# Patient Record
Sex: FEMALE | Race: BLACK OR AFRICAN AMERICAN | NOT HISPANIC OR LATINO | Employment: FULL TIME | URBAN - METROPOLITAN AREA
[De-identification: names, ages, dates, MRNs, and addresses within clinical notes are randomized per-mention and may not be internally consistent; named-entity substitution may affect disease eponyms.]

---

## 2017-06-06 ENCOUNTER — OFFICE VISIT (OUTPATIENT)
Dept: INTERNAL MEDICINE | Facility: CLINIC | Age: 47
End: 2017-06-06

## 2017-06-06 VITALS
BODY MASS INDEX: 27.58 KG/M2 | HEIGHT: 66 IN | TEMPERATURE: 97.8 F | DIASTOLIC BLOOD PRESSURE: 72 MMHG | WEIGHT: 171.6 LBS | SYSTOLIC BLOOD PRESSURE: 124 MMHG

## 2017-06-06 DIAGNOSIS — M43.16 SPONDYLOLISTHESIS AT L4-L5 LEVEL: ICD-10-CM

## 2017-06-06 DIAGNOSIS — R79.89 LOW VITAMIN D LEVEL: Primary | ICD-10-CM

## 2017-06-06 PROBLEM — Z80.3 FAMILY HISTORY OF BREAST CANCER IN FIRST DEGREE RELATIVE: Status: ACTIVE | Noted: 2017-06-06

## 2017-06-06 PROCEDURE — 99203 OFFICE O/P NEW LOW 30 MIN: CPT | Performed by: INTERNAL MEDICINE

## 2017-06-06 NOTE — PROGRESS NOTES
"Subjective   Marina Diaz is a 47 y.o. female here to establish care for vitamin D deficiency. She considers herself very healthy and doesn't have any medical problems. She comes to doc once per year for a physical. Right hip bothers her from time to time, has sacroiliac dysfunction. It does not bother her to consider any intervention; she did PT in the past. Otherwise she has no concerns. Brings lab work from physical in September 2017.    Review of Systems   Constitutional: Negative.    HENT: Negative.    Eyes: Negative.    Respiratory: Negative.    Cardiovascular: Negative.    Gastrointestinal: Negative.    Endocrine: Negative.    Genitourinary: Negative.    Musculoskeletal: Negative.    Skin: Negative.    Allergic/Immunologic: Negative.    Neurological: Negative.    Hematological: Negative.    Psychiatric/Behavioral: Negative.        Past Medical History:   Diagnosis Date   • Arthritis    • Heart murmur    • Ovarian cyst      Family History   Problem Relation Age of Onset   • Hypertension Mother    • Obesity Mother    • Cancer Other    • Diabetes Other      Past Surgical History:   Procedure Laterality Date   • BREAST LUMPECTOMY Bilateral 2007   • FOOT SURGERY       Social History     Social History   • Marital status: Single     Spouse name: N/A   • Number of children: N/A   • Years of education: N/A     Occupational History   • Not on file.     Social History Main Topics   • Smoking status: Never Smoker   • Smokeless tobacco: Never Used   • Alcohol use Yes      Comment: Rare   • Drug use: No   • Sexual activity: Not on file     Other Topics Concern   • Not on file     Social History Narrative   • No narrative on file         Current Outpatient Prescriptions:   •  Cholecalciferol (VITAMIN D3) 2000 UNITS tablet, Take  by mouth Daily., Disp: , Rfl:     Objective   /72 (BP Location: Right arm, Patient Position: Sitting, Cuff Size: Adult)  Temp 97.8 °F (36.6 °C) (Temporal Artery )   Ht 65.5\" (166.4 cm)  " Wt 171 lb 9.6 oz (77.8 kg)  BMI 28.12 kg/m2  Physical Exam   Constitutional: She is oriented to person, place, and time. She appears well-developed and well-nourished.   HENT:   Head: Normocephalic and atraumatic.   Cardiovascular: Normal rate, regular rhythm and normal heart sounds.  Exam reveals no gallop and no friction rub.    No murmur heard.  Pulmonary/Chest: Effort normal and breath sounds normal. She has no wheezes.   Musculoskeletal:   Normal gait and station   Neurological: She is alert and oriented to person, place, and time.   Skin: Skin is warm and dry.   Psychiatric: She has a normal mood and affect. Her behavior is normal. Judgment and thought content normal.   Vitals reviewed.      Assessment/Plan   Marina was seen today for establish care.    Diagnoses and all orders for this visit:    Low vitamin D level  -continue vit D supplement    Spondylolisthesis at L4-L5 level  -bothers her intermittently, advised ibu prn    RTC September for physical with labs, no pap sees gyn

## 2017-09-18 ENCOUNTER — OFFICE VISIT (OUTPATIENT)
Dept: INTERNAL MEDICINE | Facility: CLINIC | Age: 47
End: 2017-09-18

## 2017-09-18 VITALS
HEIGHT: 66 IN | BODY MASS INDEX: 29.28 KG/M2 | TEMPERATURE: 97.7 F | WEIGHT: 182.2 LBS | SYSTOLIC BLOOD PRESSURE: 126 MMHG | DIASTOLIC BLOOD PRESSURE: 74 MMHG

## 2017-09-18 DIAGNOSIS — Z00.00 HEALTH CARE MAINTENANCE: Primary | ICD-10-CM

## 2017-09-18 DIAGNOSIS — R79.89 LOW VITAMIN D LEVEL: ICD-10-CM

## 2017-09-18 LAB
25(OH)D3 SERPL-MCNC: 32 NG/ML
ALBUMIN SERPL-MCNC: 4 G/DL (ref 3.2–4.8)
ALBUMIN/GLOB SERPL: 1.3 G/DL (ref 1.5–2.5)
ALP SERPL-CCNC: 51 U/L (ref 25–100)
ALT SERPL W P-5'-P-CCNC: 14 U/L (ref 7–40)
ANION GAP SERPL CALCULATED.3IONS-SCNC: 3 MMOL/L (ref 3–11)
ARTICHOKE IGE QN: 159 MG/DL (ref 0–130)
AST SERPL-CCNC: 26 U/L (ref 0–33)
BASOPHILS # BLD AUTO: 0.01 10*3/MM3 (ref 0–0.2)
BASOPHILS NFR BLD AUTO: 0.2 % (ref 0–1)
BILIRUB SERPL-MCNC: 0.4 MG/DL (ref 0.3–1.2)
BUN BLD-MCNC: 12 MG/DL (ref 9–23)
BUN/CREAT SERPL: 15 (ref 7–25)
CALCIUM SPEC-SCNC: 8.9 MG/DL (ref 8.7–10.4)
CHLORIDE SERPL-SCNC: 104 MMOL/L (ref 99–109)
CHOLEST SERPL-MCNC: 250 MG/DL (ref 0–200)
CO2 SERPL-SCNC: 30 MMOL/L (ref 20–31)
CREAT BLD-MCNC: 0.8 MG/DL (ref 0.6–1.3)
DEPRECATED RDW RBC AUTO: 40.2 FL (ref 37–54)
EOSINOPHIL # BLD AUTO: 0.03 10*3/MM3 (ref 0–0.3)
EOSINOPHIL NFR BLD AUTO: 0.7 % (ref 0–3)
ERYTHROCYTE [DISTWIDTH] IN BLOOD BY AUTOMATED COUNT: 13.2 % (ref 11.3–14.5)
GFR SERPL CREATININE-BSD FRML MDRD: 94 ML/MIN/1.73
GLOBULIN UR ELPH-MCNC: 3.1 GM/DL
GLUCOSE BLD-MCNC: 90 MG/DL (ref 70–100)
HCT VFR BLD AUTO: 36.2 % (ref 34.5–44)
HDLC SERPL-MCNC: 68 MG/DL (ref 40–60)
HGB BLD-MCNC: 12.2 G/DL (ref 11.5–15.5)
IMM GRANULOCYTES # BLD: 0.01 10*3/MM3 (ref 0–0.03)
IMM GRANULOCYTES NFR BLD: 0.2 % (ref 0–0.6)
LYMPHOCYTES # BLD AUTO: 0.92 10*3/MM3 (ref 0.6–4.8)
LYMPHOCYTES NFR BLD AUTO: 22.4 % (ref 24–44)
MCH RBC QN AUTO: 28.2 PG (ref 27–31)
MCHC RBC AUTO-ENTMCNC: 33.7 G/DL (ref 32–36)
MCV RBC AUTO: 83.8 FL (ref 80–99)
MONOCYTES # BLD AUTO: 0.15 10*3/MM3 (ref 0–1)
MONOCYTES NFR BLD AUTO: 3.7 % (ref 0–12)
NEUTROPHILS # BLD AUTO: 2.98 10*3/MM3 (ref 1.5–8.3)
NEUTROPHILS NFR BLD AUTO: 72.8 % (ref 41–71)
PLATELET # BLD AUTO: 249 10*3/MM3 (ref 150–450)
PMV BLD AUTO: 11.3 FL (ref 6–12)
POTASSIUM BLD-SCNC: 4.3 MMOL/L (ref 3.5–5.5)
PROT SERPL-MCNC: 7.1 G/DL (ref 5.7–8.2)
RBC # BLD AUTO: 4.32 10*6/MM3 (ref 3.89–5.14)
SODIUM BLD-SCNC: 137 MMOL/L (ref 132–146)
TRIGL SERPL-MCNC: 96 MG/DL (ref 0–150)
WBC NRBC COR # BLD: 4.1 10*3/MM3 (ref 3.5–10.8)

## 2017-09-18 PROCEDURE — 99396 PREV VISIT EST AGE 40-64: CPT | Performed by: INTERNAL MEDICINE

## 2017-09-18 PROCEDURE — 80053 COMPREHEN METABOLIC PANEL: CPT | Performed by: INTERNAL MEDICINE

## 2017-09-18 PROCEDURE — 80061 LIPID PANEL: CPT | Performed by: INTERNAL MEDICINE

## 2017-09-18 PROCEDURE — 82306 VITAMIN D 25 HYDROXY: CPT | Performed by: INTERNAL MEDICINE

## 2017-09-18 PROCEDURE — 85025 COMPLETE CBC W/AUTO DIFF WBC: CPT | Performed by: INTERNAL MEDICINE

## 2017-09-18 NOTE — PROGRESS NOTES
"Subjective   Marina Diaz is a 47 y.o. female here for annual exam. No complaints today. No updates to past, family, social, surgical history, no new meds.    Review of Systems   Constitutional: Negative.    HENT: Negative.    Eyes: Negative.    Respiratory: Negative.    Cardiovascular: Negative.    Gastrointestinal: Negative.    Endocrine: Negative.    Genitourinary: Negative.    Musculoskeletal: Negative.    Skin: Negative.    Allergic/Immunologic: Negative.    Neurological: Negative.    Hematological: Negative.    Psychiatric/Behavioral: Negative.        Past Medical History:   Diagnosis Date   • Arthritis    • Heart murmur    • Ovarian cyst      Family History   Problem Relation Age of Onset   • Hypertension Mother    • Obesity Mother    • Cancer Other    • Diabetes Other      Past Surgical History:   Procedure Laterality Date   • BREAST LUMPECTOMY Bilateral 2007   • FOOT SURGERY       Social History     Social History   • Marital status: Single     Spouse name: N/A   • Number of children: N/A   • Years of education: N/A     Occupational History   • Not on file.     Social History Main Topics   • Smoking status: Never Smoker   • Smokeless tobacco: Never Used   • Alcohol use Yes      Comment: Rare   • Drug use: No   • Sexual activity: Not on file     Other Topics Concern   • Not on file     Social History Narrative         Current Outpatient Prescriptions:   •  Cholecalciferol (VITAMIN D3) 2000 UNITS tablet, Take  by mouth Daily., Disp: , Rfl:     Objective   /74 (BP Location: Left arm, Patient Position: Sitting, Cuff Size: Adult)  Temp 97.7 °F (36.5 °C) (Temporal Artery )   Ht 65.5\" (166.4 cm)  Wt 182 lb 3.2 oz (82.6 kg)  BMI 29.86 kg/m2  Physical Exam   Constitutional: She is oriented to person, place, and time. She appears well-developed and well-nourished. No distress.   HENT:   Head: Normocephalic and atraumatic.   Right Ear: Tympanic membrane, external ear and ear canal normal.   Left Ear: " Tympanic membrane, external ear and ear canal normal.   Nose: Nose normal.   Mouth/Throat: Oropharynx is clear and moist.   Eyes: Conjunctivae and lids are normal. Pupils are equal, round, and reactive to light. No scleral icterus.   Neck: Neck supple. Carotid bruit is not present. No thyroid mass and no thyromegaly present.   Cardiovascular: Normal rate, regular rhythm, normal heart sounds and intact distal pulses.  Exam reveals no gallop, no S3, no S4 and no friction rub.    No murmur heard.  Pulmonary/Chest: Effort normal and breath sounds normal. No respiratory distress. She has no wheezes. She has no rhonchi. She has no rales.   Abdominal: Soft. Bowel sounds are normal. She exhibits no distension and no mass. There is no hepatosplenomegaly. There is no tenderness.   Musculoskeletal: Normal range of motion.   Lymphadenopathy:     She has no cervical adenopathy.   Neurological: She is alert and oriented to person, place, and time. No cranial nerve deficit or sensory deficit.   Skin: Skin is warm and dry. No rash noted. No erythema. No pallor.   Psychiatric: She has a normal mood and affect. Her speech is normal and behavior is normal. Judgment and thought content normal. Cognition and memory are normal.   Vitals reviewed.      Assessment/Plan   Marina was seen today for annual exam.    Diagnoses and all orders for this visit:    Health care maintenance  -     CBC & Differential  -     Comprehensive Metabolic Panel  -     Lipid Panel  -     Vitamin D 25 Hydroxy  -     CBC Auto Differential    Low vitamin D level  -     Vitamin D 25 Hydroxy        1. HCM  -pap done recently by gyn  -mamm yearly, UTD  -cscope done previously  -fasting labs today  Reviewed the following with the patient: advised patient of need for:  immunizations discussed and influenza vaccine and ideal body weight discussed with patient.

## 2018-09-19 ENCOUNTER — OFFICE VISIT (OUTPATIENT)
Dept: INTERNAL MEDICINE | Facility: CLINIC | Age: 48
End: 2018-09-19

## 2018-09-19 VITALS
DIASTOLIC BLOOD PRESSURE: 70 MMHG | BODY MASS INDEX: 30.5 KG/M2 | SYSTOLIC BLOOD PRESSURE: 122 MMHG | TEMPERATURE: 97.1 F | WEIGHT: 189.8 LBS | HEIGHT: 66 IN

## 2018-09-19 DIAGNOSIS — R79.89 LOW VITAMIN D LEVEL: Primary | ICD-10-CM

## 2018-09-19 DIAGNOSIS — Z00.00 HEALTH CARE MAINTENANCE: ICD-10-CM

## 2018-09-19 DIAGNOSIS — R53.82 CHRONIC FATIGUE: ICD-10-CM

## 2018-09-19 LAB
25(OH)D3 SERPL-MCNC: 28.2 NG/ML
ALBUMIN SERPL-MCNC: 4.01 G/DL (ref 3.2–4.8)
ALBUMIN/GLOB SERPL: 1.7 G/DL (ref 1.5–2.5)
ALP SERPL-CCNC: 39 U/L (ref 25–100)
ALT SERPL W P-5'-P-CCNC: 13 U/L (ref 7–40)
ANION GAP SERPL CALCULATED.3IONS-SCNC: 7 MMOL/L (ref 3–11)
ARTICHOKE IGE QN: 116 MG/DL (ref 0–130)
AST SERPL-CCNC: 22 U/L (ref 0–33)
BILIRUB SERPL-MCNC: 0.4 MG/DL (ref 0.3–1.2)
BUN BLD-MCNC: 7 MG/DL (ref 9–23)
BUN/CREAT SERPL: 8.2 (ref 7–25)
CALCIUM SPEC-SCNC: 8.8 MG/DL (ref 8.7–10.4)
CHLORIDE SERPL-SCNC: 102 MMOL/L (ref 99–109)
CHOLEST SERPL-MCNC: 197 MG/DL (ref 0–200)
CO2 SERPL-SCNC: 26 MMOL/L (ref 20–31)
CREAT BLD-MCNC: 0.85 MG/DL (ref 0.6–1.3)
DEPRECATED RDW RBC AUTO: 40.2 FL (ref 37–54)
ERYTHROCYTE [DISTWIDTH] IN BLOOD BY AUTOMATED COUNT: 13.3 % (ref 11.3–14.5)
GFR SERPL CREATININE-BSD FRML MDRD: 87 ML/MIN/1.73
GLOBULIN UR ELPH-MCNC: 2.4 GM/DL
GLUCOSE BLD-MCNC: 76 MG/DL (ref 70–100)
HCT VFR BLD AUTO: 37.1 % (ref 34.5–44)
HDLC SERPL-MCNC: 55 MG/DL (ref 40–60)
HGB BLD-MCNC: 12.2 G/DL (ref 11.5–15.5)
MCH RBC QN AUTO: 27.8 PG (ref 27–31)
MCHC RBC AUTO-ENTMCNC: 32.9 G/DL (ref 32–36)
MCV RBC AUTO: 84.5 FL (ref 80–99)
PLATELET # BLD AUTO: 242 10*3/MM3 (ref 150–450)
PMV BLD AUTO: 12.1 FL (ref 6–12)
POTASSIUM BLD-SCNC: 4.5 MMOL/L (ref 3.5–5.5)
PROT SERPL-MCNC: 6.4 G/DL (ref 5.7–8.2)
RBC # BLD AUTO: 4.39 10*6/MM3 (ref 3.89–5.14)
SODIUM BLD-SCNC: 135 MMOL/L (ref 132–146)
TRIGL SERPL-MCNC: 144 MG/DL (ref 0–150)
TSH SERPL DL<=0.05 MIU/L-ACNC: 1.47 MIU/ML (ref 0.35–5.35)
VIT B12 BLD-MCNC: 472 PG/ML (ref 211–911)
WBC NRBC COR # BLD: 4.77 10*3/MM3 (ref 3.5–10.8)

## 2018-09-19 PROCEDURE — 80053 COMPREHEN METABOLIC PANEL: CPT | Performed by: INTERNAL MEDICINE

## 2018-09-19 PROCEDURE — 82306 VITAMIN D 25 HYDROXY: CPT | Performed by: INTERNAL MEDICINE

## 2018-09-19 PROCEDURE — 84443 ASSAY THYROID STIM HORMONE: CPT | Performed by: INTERNAL MEDICINE

## 2018-09-19 PROCEDURE — 99396 PREV VISIT EST AGE 40-64: CPT | Performed by: INTERNAL MEDICINE

## 2018-09-19 PROCEDURE — 85027 COMPLETE CBC AUTOMATED: CPT | Performed by: INTERNAL MEDICINE

## 2018-09-19 PROCEDURE — 80061 LIPID PANEL: CPT | Performed by: INTERNAL MEDICINE

## 2018-09-19 PROCEDURE — 82607 VITAMIN B-12: CPT | Performed by: INTERNAL MEDICINE

## 2018-09-19 NOTE — PROGRESS NOTES
Subjective   Marina iDaz is a 48 y.o. female here for annual exam.  She is UTD pap, had mammo earlier this year which was negative. Mother with in situ breast cancer (still living, in remission). She inquires about a cscope for screening purposes. Fasting for labs today. Still struggles with right sided back pain and sciatica from time to time. Has chronic fatigue and weight gain despite not changing diet.     Review of Systems   Constitutional: Positive for fatigue and unexpected weight change.   HENT: Negative.    Eyes: Negative.    Respiratory: Negative.    Cardiovascular: Negative.    Gastrointestinal: Negative.    Endocrine: Negative.    Genitourinary: Negative.    Musculoskeletal: Positive for back pain.   Skin: Negative.    Allergic/Immunologic: Negative.    Neurological:        Sciatica   Hematological: Negative.    Psychiatric/Behavioral: Negative.        Past Medical History:   Diagnosis Date   • Arthritis    • Heart murmur    • Ovarian cyst      Family History   Problem Relation Age of Onset   • Hypertension Mother    • Obesity Mother    • Cancer Other    • Diabetes Other      Past Surgical History:   Procedure Laterality Date   • BREAST LUMPECTOMY Bilateral 2007   • FOOT SURGERY       Social History     Social History   • Marital status: Single     Spouse name: N/A   • Number of children: N/A   • Years of education: N/A     Occupational History   • Not on file.     Social History Main Topics   • Smoking status: Never Smoker   • Smokeless tobacco: Never Used   • Alcohol use Yes      Comment: Rare   • Drug use: No   • Sexual activity: Not on file     Other Topics Concern   • Not on file     Social History Narrative   • No narrative on file         Current Outpatient Prescriptions:   •  Cholecalciferol (VITAMIN D3) 2000 UNITS tablet, Take  by mouth Daily., Disp: , Rfl:     Objective   /70 (BP Location: Right arm, Patient Position: Sitting, Cuff Size: Adult)   Temp 97.1 °F (36.2 °C) (Temporal  "Artery )   Ht 166.4 cm (65.5\")   Wt 86.1 kg (189 lb 12.8 oz)   BMI 31.10 kg/m²   Physical Exam   Constitutional: She is oriented to person, place, and time. She appears well-developed and well-nourished. No distress.   HENT:   Head: Normocephalic and atraumatic.   Right Ear: Tympanic membrane, external ear and ear canal normal.   Left Ear: Tympanic membrane, external ear and ear canal normal.   Nose: Nose normal.   Mouth/Throat: Oropharynx is clear and moist.   Eyes: Pupils are equal, round, and reactive to light. Conjunctivae and lids are normal. No scleral icterus.   Neck: Neck supple. Carotid bruit is not present. No thyroid mass and no thyromegaly present.   Cardiovascular: Normal rate, regular rhythm, normal heart sounds and intact distal pulses.  Exam reveals no gallop, no S3, no S4 and no friction rub.    No murmur heard.  Pulmonary/Chest: Effort normal and breath sounds normal. No respiratory distress. She has no wheezes. She has no rhonchi. She has no rales.   Abdominal: Soft. Bowel sounds are normal. She exhibits no distension and no mass. There is no hepatosplenomegaly. There is no tenderness.   Musculoskeletal: Normal range of motion.   Lymphadenopathy:     She has no cervical adenopathy.   Neurological: She is alert and oriented to person, place, and time. No cranial nerve deficit or sensory deficit.   Skin: Skin is warm and dry. No rash noted. No erythema. No pallor.   Psychiatric: She has a normal mood and affect. Her speech is normal and behavior is normal. Judgment and thought content normal. Cognition and memory are normal.   Vitals reviewed.      Assessment/Plan   Marina was seen today for annual exam.    Diagnoses and all orders for this visit:    Low vitamin D level  -     Vitamin D 25 Hydroxy    Health care maintenance  -     Lipid Panel  -     CBC (No Diff)  -     Comprehensive Metabolic Panel    Chronic fatigue  -     Vitamin B12  -     TSH        1. HCM  -pap UTD  -mamm UTD, mother with " breast cancer  -cscope ordered by gyn  -fasting labs today  -defers flu vaccine  Reviewed the following with the patient: advised patient of need for:  mammogram, colonoscopy and immunizations discussed and weight loss encouraged.

## 2019-02-18 ENCOUNTER — OFFICE VISIT (OUTPATIENT)
Dept: INTERNAL MEDICINE | Facility: CLINIC | Age: 49
End: 2019-02-18

## 2019-02-18 ENCOUNTER — HOSPITAL ENCOUNTER (OUTPATIENT)
Dept: CT IMAGING | Facility: HOSPITAL | Age: 49
Discharge: HOME | End: 2019-02-18
Admitting: INTERNAL MEDICINE

## 2019-02-18 VITALS — HEIGHT: 66 IN | TEMPERATURE: 97.9 F | BODY MASS INDEX: 29.92 KG/M2 | WEIGHT: 186.2 LBS

## 2019-02-18 DIAGNOSIS — R11.0 NAUSEA: ICD-10-CM

## 2019-02-18 DIAGNOSIS — R19.03 RIGHT LOWER QUADRANT ABDOMINAL MASS: ICD-10-CM

## 2019-02-18 DIAGNOSIS — R19.03 RIGHT LOWER QUADRANT ABDOMINAL MASS: Primary | ICD-10-CM

## 2019-02-18 PROCEDURE — 99214 OFFICE O/P EST MOD 30 MIN: CPT | Performed by: INTERNAL MEDICINE

## 2019-02-18 PROCEDURE — 25010000002 IOPAMIDOL 61 % SOLUTION: Performed by: INTERNAL MEDICINE

## 2019-02-18 PROCEDURE — 74178 CT ABD&PLV WO CNTR FLWD CNTR: CPT

## 2019-02-18 RX ADMIN — BARIUM SULFATE 450 ML: 21 SUSPENSION ORAL at 15:30

## 2019-02-18 RX ADMIN — IOPAMIDOL 95 ML: 612 INJECTION, SOLUTION INTRAVENOUS at 16:33

## 2019-02-18 NOTE — PROGRESS NOTES
"Subjective   Marina Diaz is a 48 y.o. female here for nausea and RLQ pain occurring for several weeks/months. Has felt a mass in the RLQ only twice, unsure if it goes away. She had her first cscope this fall that was negative. She has regular menses, though they have been heavier lately. Some nausea every morning. Regular brown formed BMs. No weight loss, fever, chills. Has hx of ovarian cysts. The pain is colicky. Nothing brings it on, will feel like pressure and pain in RLQ, goes away after a time. Happens a few times a week.       The following portions of the patient's history were reviewed and updated as appropriate: allergies, current medications, past medical history, past social history, past surgical history and problem list.    Review of Systems:  General: negative  : change in menses  GI: see hpi    Objective   Temp 97.9 °F (36.6 °C) (Temporal)   Ht 166.4 cm (65.5\")   Wt 84.5 kg (186 lb 3.2 oz)   BMI 30.51 kg/m²     Physical Exam   Constitutional: She is oriented to person, place, and time. She appears well-developed and well-nourished.   Pulmonary/Chest: Effort normal.   Abdominal:       ~7cm oval mass felt in indicated area, firm, not moveable, no guarding, some tenderness reported. Suprapubic area also feels very firm though no edema/ascites   Neurological: She is alert and oriented to person, place, and time.   Skin: Skin is warm and dry.   Psychiatric: She has a normal mood and affect. Her behavior is normal. Judgment and thought content normal.   Vitals reviewed.      Assessment/Plan   Marina was seen today for abdominal pain.    Diagnoses and all orders for this visit:    Right lower quadrant abdominal mass  -     Very concerned given palpable mass and firm uterus. CT STAT. Discussed possible etiology with pt, including possible cancer discussed with pt. Given RLQ mass and firm         uterus, would favor  etiology  -     CT Abdomen Pelvis With & Without Contrast; " Future    Nausea

## 2019-05-21 ENCOUNTER — APPOINTMENT (OUTPATIENT)
Dept: PREADMISSION TESTING | Facility: HOSPITAL | Age: 49
End: 2019-05-21

## 2019-05-21 VITALS — BODY MASS INDEX: 29.69 KG/M2 | WEIGHT: 184.75 LBS | HEIGHT: 66 IN

## 2019-05-21 DIAGNOSIS — Z01.89 LABORATORY TEST: Primary | ICD-10-CM

## 2019-05-21 LAB
ANION GAP SERPL CALCULATED.3IONS-SCNC: 11 MMOL/L
BUN BLD-MCNC: 10 MG/DL (ref 6–20)
BUN/CREAT SERPL: 12 (ref 7–25)
CALCIUM SPEC-SCNC: 8.7 MG/DL (ref 8.6–10.5)
CHLORIDE SERPL-SCNC: 103 MMOL/L (ref 98–107)
CO2 SERPL-SCNC: 25 MMOL/L (ref 22–29)
CREAT BLD-MCNC: 0.83 MG/DL (ref 0.57–1)
DEPRECATED RDW RBC AUTO: 40.4 FL (ref 37–54)
ERYTHROCYTE [DISTWIDTH] IN BLOOD BY AUTOMATED COUNT: 13.3 % (ref 12.3–15.4)
GFR SERPL CREATININE-BSD FRML MDRD: 89 ML/MIN/1.73
GLUCOSE BLD-MCNC: 88 MG/DL (ref 65–99)
HBA1C MFR BLD: 4.9 % (ref 4.8–5.6)
HCT VFR BLD AUTO: 34.6 % (ref 34–46.6)
HGB BLD-MCNC: 11.5 G/DL (ref 12–15.9)
MCH RBC QN AUTO: 27.9 PG (ref 26.6–33)
MCHC RBC AUTO-ENTMCNC: 33.2 G/DL (ref 31.5–35.7)
MCV RBC AUTO: 84 FL (ref 79–97)
PLATELET # BLD AUTO: 237 10*3/MM3 (ref 140–450)
PMV BLD AUTO: 11.1 FL (ref 6–12)
POTASSIUM BLD-SCNC: 3.7 MMOL/L (ref 3.5–5.2)
RBC # BLD AUTO: 4.12 10*6/MM3 (ref 3.77–5.28)
SODIUM BLD-SCNC: 139 MMOL/L (ref 136–145)
WBC NRBC COR # BLD: 4.54 10*3/MM3 (ref 3.4–10.8)

## 2019-05-21 PROCEDURE — 83036 HEMOGLOBIN GLYCOSYLATED A1C: CPT | Performed by: OBSTETRICS & GYNECOLOGY

## 2019-05-21 PROCEDURE — 85027 COMPLETE CBC AUTOMATED: CPT | Performed by: OBSTETRICS & GYNECOLOGY

## 2019-05-21 PROCEDURE — 86900 BLOOD TYPING SEROLOGIC ABO: CPT

## 2019-05-21 PROCEDURE — 86901 BLOOD TYPING SEROLOGIC RH(D): CPT

## 2019-05-21 PROCEDURE — 36415 COLL VENOUS BLD VENIPUNCTURE: CPT

## 2019-05-21 PROCEDURE — 80048 BASIC METABOLIC PNL TOTAL CA: CPT | Performed by: OBSTETRICS & GYNECOLOGY

## 2019-05-22 LAB
ABO GROUP BLD: NORMAL
RH BLD: NEGATIVE

## 2019-05-29 ENCOUNTER — ANESTHESIA (OUTPATIENT)
Dept: PERIOP | Facility: HOSPITAL | Age: 49
End: 2019-05-29

## 2019-05-29 ENCOUNTER — ANESTHESIA EVENT (OUTPATIENT)
Dept: PERIOP | Facility: HOSPITAL | Age: 49
End: 2019-05-29

## 2019-05-29 ENCOUNTER — HOSPITAL ENCOUNTER (INPATIENT)
Facility: HOSPITAL | Age: 49
LOS: 2 days | Discharge: HOME | End: 2019-05-31
Attending: OBSTETRICS & GYNECOLOGY | Admitting: OBSTETRICS & GYNECOLOGY

## 2019-05-29 DIAGNOSIS — D25.1 FIBROIDS, INTRAMURAL: ICD-10-CM

## 2019-05-29 PROBLEM — Z90.710 S/P ABDOMINAL HYSTERECTOMY: Status: ACTIVE | Noted: 2019-05-29

## 2019-05-29 PROBLEM — Z90.710 S/P ABDOMINAL HYSTERECTOMY: Status: RESOLVED | Noted: 2019-05-29 | Resolved: 2019-05-29

## 2019-05-29 LAB
ABO GROUP BLD: NORMAL
B-HCG UR QL: NEGATIVE
BLD GP AB SCN SERPL QL: NEGATIVE
INTERNAL NEGATIVE CONTROL: NEGATIVE
INTERNAL POSITIVE CONTROL: POSITIVE
Lab: NORMAL
RH BLD: NEGATIVE
T&S EXPIRATION DATE: NORMAL

## 2019-05-29 PROCEDURE — 25010000002 NEOSTIGMINE 10 MG/10ML SOLUTION: Performed by: NURSE ANESTHETIST, CERTIFIED REGISTERED

## 2019-05-29 PROCEDURE — 25010000002 PROPOFOL 10 MG/ML EMULSION: Performed by: NURSE ANESTHETIST, CERTIFIED REGISTERED

## 2019-05-29 PROCEDURE — 81025 URINE PREGNANCY TEST: CPT | Performed by: OBSTETRICS & GYNECOLOGY

## 2019-05-29 PROCEDURE — 88307 TISSUE EXAM BY PATHOLOGIST: CPT | Performed by: OBSTETRICS & GYNECOLOGY

## 2019-05-29 PROCEDURE — 86900 BLOOD TYPING SEROLOGIC ABO: CPT | Performed by: OBSTETRICS & GYNECOLOGY

## 2019-05-29 PROCEDURE — 0UT70ZZ RESECTION OF BILATERAL FALLOPIAN TUBES, OPEN APPROACH: ICD-10-PCS | Performed by: OBSTETRICS & GYNECOLOGY

## 2019-05-29 PROCEDURE — 25010000002 DEXAMETHASONE SODIUM PHOSPHATE 10 MG/ML SOLUTION: Performed by: NURSE ANESTHETIST, CERTIFIED REGISTERED

## 2019-05-29 PROCEDURE — 25010000003 CEFAZOLIN IN DEXTROSE 2-4 GM/100ML-% SOLUTION: Performed by: OBSTETRICS & GYNECOLOGY

## 2019-05-29 PROCEDURE — 86850 RBC ANTIBODY SCREEN: CPT | Performed by: OBSTETRICS & GYNECOLOGY

## 2019-05-29 PROCEDURE — 25010000002 ONDANSETRON PER 1 MG: Performed by: NURSE ANESTHETIST, CERTIFIED REGISTERED

## 2019-05-29 PROCEDURE — 25010000002 HEPARIN (PORCINE) 5000 UNIT/0.5ML SOLUTION: Performed by: OBSTETRICS & GYNECOLOGY

## 2019-05-29 PROCEDURE — 88311 DECALCIFY TISSUE: CPT | Performed by: OBSTETRICS & GYNECOLOGY

## 2019-05-29 PROCEDURE — 86901 BLOOD TYPING SEROLOGIC RH(D): CPT | Performed by: OBSTETRICS & GYNECOLOGY

## 2019-05-29 PROCEDURE — 0UT90ZZ RESECTION OF UTERUS, OPEN APPROACH: ICD-10-PCS | Performed by: OBSTETRICS & GYNECOLOGY

## 2019-05-29 PROCEDURE — 25010000002 BUPRENORPHINE PER 0.1 MG: Performed by: NURSE ANESTHETIST, CERTIFIED REGISTERED

## 2019-05-29 DEVICE — SEALANT WND FIBRIN TISSEEL PREFIL/SYR/PRIMAFZ 4ML: Type: IMPLANTABLE DEVICE | Status: FUNCTIONAL

## 2019-05-29 RX ORDER — CEFAZOLIN SODIUM 2 G/100ML
2 INJECTION, SOLUTION INTRAVENOUS ONCE
Status: COMPLETED | OUTPATIENT
Start: 2019-05-29 | End: 2019-05-29

## 2019-05-29 RX ORDER — HYDROCODONE BITARTRATE AND ACETAMINOPHEN 7.5; 325 MG/1; MG/1
1 TABLET ORAL EVERY 4 HOURS PRN
Status: DISCONTINUED | OUTPATIENT
Start: 2019-05-29 | End: 2019-05-31 | Stop reason: HOSPADM

## 2019-05-29 RX ORDER — FIBRINOGEN HUMAN AND THROMBIN HUMAN 90; 500 [IU]/ML; [IU]/ML
SOLUTION TOPICAL AS NEEDED
Status: DISCONTINUED | OUTPATIENT
Start: 2019-05-29 | End: 2019-05-29 | Stop reason: HOSPADM

## 2019-05-29 RX ORDER — KETOROLAC TROMETHAMINE 30 MG/ML
30 INJECTION, SOLUTION INTRAMUSCULAR; INTRAVENOUS EVERY 6 HOURS PRN
Status: DISCONTINUED | OUTPATIENT
Start: 2019-05-29 | End: 2019-05-30

## 2019-05-29 RX ORDER — ATRACURIUM BESYLATE 10 MG/ML
INJECTION, SOLUTION INTRAVENOUS AS NEEDED
Status: DISCONTINUED | OUTPATIENT
Start: 2019-05-29 | End: 2019-05-29 | Stop reason: SURG

## 2019-05-29 RX ORDER — FENTANYL CITRATE 50 UG/ML
50 INJECTION, SOLUTION INTRAMUSCULAR; INTRAVENOUS
Status: DISCONTINUED | OUTPATIENT
Start: 2019-05-29 | End: 2019-05-29 | Stop reason: HOSPADM

## 2019-05-29 RX ORDER — BUPIVACAINE HYDROCHLORIDE 2.5 MG/ML
INJECTION, SOLUTION EPIDURAL; INFILTRATION; INTRACAUDAL
Status: COMPLETED | OUTPATIENT
Start: 2019-05-29 | End: 2019-05-29

## 2019-05-29 RX ORDER — SIMETHICONE 80 MG
80 TABLET,CHEWABLE ORAL 4 TIMES DAILY PRN
Status: DISCONTINUED | OUTPATIENT
Start: 2019-05-29 | End: 2019-05-31 | Stop reason: HOSPADM

## 2019-05-29 RX ORDER — LIDOCAINE HYDROCHLORIDE 10 MG/ML
0.5 INJECTION, SOLUTION EPIDURAL; INFILTRATION; INTRACAUDAL; PERINEURAL ONCE AS NEEDED
Status: COMPLETED | OUTPATIENT
Start: 2019-05-29 | End: 2019-05-29

## 2019-05-29 RX ORDER — PROPOFOL 10 MG/ML
VIAL (ML) INTRAVENOUS CONTINUOUS PRN
Status: DISCONTINUED | OUTPATIENT
Start: 2019-05-29 | End: 2019-05-29 | Stop reason: SURG

## 2019-05-29 RX ORDER — SODIUM CHLORIDE 0.9 % (FLUSH) 0.9 %
3-10 SYRINGE (ML) INJECTION AS NEEDED
Status: DISCONTINUED | OUTPATIENT
Start: 2019-05-29 | End: 2019-05-29 | Stop reason: HOSPADM

## 2019-05-29 RX ORDER — PROMETHAZINE HYDROCHLORIDE 12.5 MG/1
12.5 TABLET ORAL EVERY 6 HOURS PRN
Status: DISCONTINUED | OUTPATIENT
Start: 2019-05-29 | End: 2019-05-31 | Stop reason: HOSPADM

## 2019-05-29 RX ORDER — ONDANSETRON 4 MG/1
4 TABLET, FILM COATED ORAL EVERY 6 HOURS PRN
Status: DISCONTINUED | OUTPATIENT
Start: 2019-05-29 | End: 2019-05-31 | Stop reason: HOSPADM

## 2019-05-29 RX ORDER — PROPOFOL 10 MG/ML
VIAL (ML) INTRAVENOUS AS NEEDED
Status: DISCONTINUED | OUTPATIENT
Start: 2019-05-29 | End: 2019-05-29 | Stop reason: SURG

## 2019-05-29 RX ORDER — FAMOTIDINE 10 MG/ML
20 INJECTION, SOLUTION INTRAVENOUS ONCE
Status: DISCONTINUED | OUTPATIENT
Start: 2019-05-29 | End: 2019-05-29

## 2019-05-29 RX ORDER — PROMETHAZINE HYDROCHLORIDE 12.5 MG/1
12.5 SUPPOSITORY RECTAL EVERY 6 HOURS PRN
Status: DISCONTINUED | OUTPATIENT
Start: 2019-05-29 | End: 2019-05-31 | Stop reason: HOSPADM

## 2019-05-29 RX ORDER — ESMOLOL HYDROCHLORIDE 10 MG/ML
INJECTION INTRAVENOUS AS NEEDED
Status: DISCONTINUED | OUTPATIENT
Start: 2019-05-29 | End: 2019-05-29 | Stop reason: SURG

## 2019-05-29 RX ORDER — ONDANSETRON 2 MG/ML
4 INJECTION INTRAMUSCULAR; INTRAVENOUS ONCE AS NEEDED
Status: DISCONTINUED | OUTPATIENT
Start: 2019-05-29 | End: 2019-05-29 | Stop reason: HOSPADM

## 2019-05-29 RX ORDER — PROMETHAZINE HYDROCHLORIDE 25 MG/1
25 TABLET ORAL ONCE AS NEEDED
Status: DISCONTINUED | OUTPATIENT
Start: 2019-05-29 | End: 2019-05-29 | Stop reason: HOSPADM

## 2019-05-29 RX ORDER — MAGNESIUM HYDROXIDE 1200 MG/15ML
LIQUID ORAL AS NEEDED
Status: DISCONTINUED | OUTPATIENT
Start: 2019-05-29 | End: 2019-05-29 | Stop reason: HOSPADM

## 2019-05-29 RX ORDER — SODIUM CHLORIDE 9 MG/ML
100 INJECTION, SOLUTION INTRAVENOUS CONTINUOUS
Status: DISCONTINUED | OUTPATIENT
Start: 2019-05-29 | End: 2019-05-31 | Stop reason: HOSPADM

## 2019-05-29 RX ORDER — NALOXONE HCL 0.4 MG/ML
0.1 VIAL (ML) INJECTION
Status: DISCONTINUED | OUTPATIENT
Start: 2019-05-29 | End: 2019-05-31 | Stop reason: HOSPADM

## 2019-05-29 RX ORDER — ONDANSETRON 2 MG/ML
4 INJECTION INTRAMUSCULAR; INTRAVENOUS EVERY 6 HOURS PRN
Status: DISCONTINUED | OUTPATIENT
Start: 2019-05-29 | End: 2019-05-31 | Stop reason: HOSPADM

## 2019-05-29 RX ORDER — PROMETHAZINE HYDROCHLORIDE 25 MG/ML
12.5 INJECTION, SOLUTION INTRAMUSCULAR; INTRAVENOUS EVERY 6 HOURS PRN
Status: DISCONTINUED | OUTPATIENT
Start: 2019-05-29 | End: 2019-05-31 | Stop reason: HOSPADM

## 2019-05-29 RX ORDER — NEOSTIGMINE METHYLSULFATE 1 MG/ML
INJECTION, SOLUTION INTRAVENOUS AS NEEDED
Status: DISCONTINUED | OUTPATIENT
Start: 2019-05-29 | End: 2019-05-29 | Stop reason: SURG

## 2019-05-29 RX ORDER — HEPARIN SODIUM 5000 [USP'U]/.5ML
INJECTION, SOLUTION INTRAVENOUS; SUBCUTANEOUS AS NEEDED
Status: DISCONTINUED | OUTPATIENT
Start: 2019-05-29 | End: 2019-05-29 | Stop reason: HOSPADM

## 2019-05-29 RX ORDER — MEPERIDINE HYDROCHLORIDE 25 MG/ML
12.5 INJECTION INTRAMUSCULAR; INTRAVENOUS; SUBCUTANEOUS ONCE
Status: COMPLETED | OUTPATIENT
Start: 2019-05-29 | End: 2019-05-29

## 2019-05-29 RX ORDER — HYDROMORPHONE HYDROCHLORIDE 1 MG/ML
0.5 INJECTION, SOLUTION INTRAMUSCULAR; INTRAVENOUS; SUBCUTANEOUS
Status: DISCONTINUED | OUTPATIENT
Start: 2019-05-29 | End: 2019-05-29 | Stop reason: HOSPADM

## 2019-05-29 RX ORDER — CEFAZOLIN SODIUM 2 G/100ML
2 INJECTION, SOLUTION INTRAVENOUS EVERY 8 HOURS
Status: COMPLETED | OUTPATIENT
Start: 2019-05-29 | End: 2019-05-29

## 2019-05-29 RX ORDER — PROMETHAZINE HYDROCHLORIDE 25 MG/1
25 SUPPOSITORY RECTAL ONCE AS NEEDED
Status: DISCONTINUED | OUTPATIENT
Start: 2019-05-29 | End: 2019-05-29 | Stop reason: HOSPADM

## 2019-05-29 RX ORDER — DEXAMETHASONE SODIUM PHOSPHATE 10 MG/ML
INJECTION, SOLUTION INTRAMUSCULAR; INTRAVENOUS
Status: COMPLETED | OUTPATIENT
Start: 2019-05-29 | End: 2019-05-29

## 2019-05-29 RX ORDER — PHENAZOPYRIDINE HYDROCHLORIDE 100 MG/1
200 TABLET, FILM COATED ORAL ONCE
Status: COMPLETED | OUTPATIENT
Start: 2019-05-29 | End: 2019-05-29

## 2019-05-29 RX ORDER — TEMAZEPAM 15 MG/1
15 CAPSULE ORAL NIGHTLY PRN
Status: DISCONTINUED | OUTPATIENT
Start: 2019-05-29 | End: 2019-05-31 | Stop reason: HOSPADM

## 2019-05-29 RX ORDER — HEPARIN SODIUM 5000 [USP'U]/ML
5000 INJECTION, SOLUTION INTRAVENOUS; SUBCUTANEOUS EVERY 12 HOURS SCHEDULED
Status: DISCONTINUED | OUTPATIENT
Start: 2019-05-30 | End: 2019-05-31 | Stop reason: HOSPADM

## 2019-05-29 RX ORDER — ONDANSETRON 2 MG/ML
INJECTION INTRAMUSCULAR; INTRAVENOUS AS NEEDED
Status: DISCONTINUED | OUTPATIENT
Start: 2019-05-29 | End: 2019-05-29 | Stop reason: SURG

## 2019-05-29 RX ORDER — FAMOTIDINE 20 MG/1
20 TABLET, FILM COATED ORAL ONCE
Status: COMPLETED | OUTPATIENT
Start: 2019-05-29 | End: 2019-05-29

## 2019-05-29 RX ORDER — GLYCOPYRROLATE 0.2 MG/ML
INJECTION INTRAMUSCULAR; INTRAVENOUS AS NEEDED
Status: DISCONTINUED | OUTPATIENT
Start: 2019-05-29 | End: 2019-05-29 | Stop reason: SURG

## 2019-05-29 RX ORDER — LIDOCAINE HYDROCHLORIDE 10 MG/ML
INJECTION, SOLUTION EPIDURAL; INFILTRATION; INTRACAUDAL; PERINEURAL AS NEEDED
Status: DISCONTINUED | OUTPATIENT
Start: 2019-05-29 | End: 2019-05-29 | Stop reason: SURG

## 2019-05-29 RX ORDER — PROMETHAZINE HYDROCHLORIDE 25 MG/ML
6.25 INJECTION, SOLUTION INTRAMUSCULAR; INTRAVENOUS ONCE AS NEEDED
Status: DISCONTINUED | OUTPATIENT
Start: 2019-05-29 | End: 2019-05-29 | Stop reason: HOSPADM

## 2019-05-29 RX ORDER — SODIUM CHLORIDE 0.9 % (FLUSH) 0.9 %
3 SYRINGE (ML) INJECTION EVERY 12 HOURS SCHEDULED
Status: DISCONTINUED | OUTPATIENT
Start: 2019-05-29 | End: 2019-05-29 | Stop reason: HOSPADM

## 2019-05-29 RX ORDER — HEPARIN SODIUM 5000 [USP'U]/ML
5000 INJECTION, SOLUTION INTRAVENOUS; SUBCUTANEOUS ONCE
Status: DISCONTINUED | OUTPATIENT
Start: 2019-05-29 | End: 2019-05-29 | Stop reason: HOSPADM

## 2019-05-29 RX ORDER — BUPRENORPHINE HYDROCHLORIDE 0.32 MG/ML
INJECTION INTRAMUSCULAR; INTRAVENOUS
Status: COMPLETED | OUTPATIENT
Start: 2019-05-29 | End: 2019-05-29

## 2019-05-29 RX ORDER — HYDROMORPHONE HYDROCHLORIDE 1 MG/ML
0.5 INJECTION, SOLUTION INTRAMUSCULAR; INTRAVENOUS; SUBCUTANEOUS
Status: DISCONTINUED | OUTPATIENT
Start: 2019-05-29 | End: 2019-05-31 | Stop reason: HOSPADM

## 2019-05-29 RX ORDER — SODIUM CHLORIDE, SODIUM LACTATE, POTASSIUM CHLORIDE, CALCIUM CHLORIDE 600; 310; 30; 20 MG/100ML; MG/100ML; MG/100ML; MG/100ML
9 INJECTION, SOLUTION INTRAVENOUS CONTINUOUS
Status: DISCONTINUED | OUTPATIENT
Start: 2019-05-29 | End: 2019-05-31 | Stop reason: HOSPADM

## 2019-05-29 RX ADMIN — DEXAMETHASONE SODIUM PHOSPHATE 4 MG: 10 INJECTION INTRAMUSCULAR; INTRAVENOUS at 07:41

## 2019-05-29 RX ADMIN — FAMOTIDINE 20 MG: 20 TABLET ORAL at 06:41

## 2019-05-29 RX ADMIN — GLYCOPYRROLATE 0.2 MG: 0.2 INJECTION, SOLUTION INTRAMUSCULAR; INTRAVENOUS at 08:21

## 2019-05-29 RX ADMIN — CEFAZOLIN SODIUM 2 G: 2 INJECTION, SOLUTION INTRAVENOUS at 15:32

## 2019-05-29 RX ADMIN — MEPERIDINE HYDROCHLORIDE 12.5 MG: 25 INJECTION INTRAMUSCULAR; INTRAVENOUS; SUBCUTANEOUS at 10:59

## 2019-05-29 RX ADMIN — PROPOFOL 200 MG: 10 INJECTION, EMULSION INTRAVENOUS at 07:39

## 2019-05-29 RX ADMIN — CEFAZOLIN SODIUM 2 G: 2 INJECTION, SOLUTION INTRAVENOUS at 23:14

## 2019-05-29 RX ADMIN — CEFAZOLIN SODIUM 2 G: 2 INJECTION, SOLUTION INTRAVENOUS at 07:42

## 2019-05-29 RX ADMIN — DEXAMETHASONE SODIUM PHOSPHATE 6 MG: 10 INJECTION INTRAMUSCULAR; INTRAVENOUS at 07:45

## 2019-05-29 RX ADMIN — ONDANSETRON 4 MG: 2 INJECTION INTRAMUSCULAR; INTRAVENOUS at 09:55

## 2019-05-29 RX ADMIN — SODIUM CHLORIDE 100 ML/HR: 9 INJECTION, SOLUTION INTRAVENOUS at 23:11

## 2019-05-29 RX ADMIN — LIDOCAINE HYDROCHLORIDE 50 MG: 10 INJECTION, SOLUTION EPIDURAL; INFILTRATION; INTRACAUDAL; PERINEURAL at 07:39

## 2019-05-29 RX ADMIN — MEPERIDINE HYDROCHLORIDE 12.5 MG: 25 INJECTION INTRAMUSCULAR; INTRAVENOUS; SUBCUTANEOUS at 10:50

## 2019-05-29 RX ADMIN — LIDOCAINE HYDROCHLORIDE 0.3 ML: 10 INJECTION, SOLUTION EPIDURAL; INFILTRATION; INTRACAUDAL; PERINEURAL at 06:41

## 2019-05-29 RX ADMIN — GLYCOPYRROLATE 0.4 MG: 0.2 INJECTION, SOLUTION INTRAMUSCULAR; INTRAVENOUS at 10:06

## 2019-05-29 RX ADMIN — NEOSTIGMINE METHYLSULFATE 3 MG: 1 INJECTION, SOLUTION INTRAVENOUS at 10:06

## 2019-05-29 RX ADMIN — BUPRENORPHINE HYDROCHLORIDE 0.3 MG: 0.32 INJECTION INTRAMUSCULAR; INTRAVENOUS at 07:41

## 2019-05-29 RX ADMIN — ESMOLOL HYDROCHLORIDE 20 MG: 10 INJECTION INTRAVENOUS at 07:39

## 2019-05-29 RX ADMIN — PHENAZOPYRIDINE HYDROCHLORIDE 200 MG: 100 TABLET ORAL at 06:38

## 2019-05-29 RX ADMIN — SODIUM CHLORIDE 100 ML/HR: 9 INJECTION, SOLUTION INTRAVENOUS at 11:29

## 2019-05-29 RX ADMIN — SODIUM CHLORIDE, POTASSIUM CHLORIDE, SODIUM LACTATE AND CALCIUM CHLORIDE: 600; 310; 30; 20 INJECTION, SOLUTION INTRAVENOUS at 08:38

## 2019-05-29 RX ADMIN — SODIUM CHLORIDE, POTASSIUM CHLORIDE, SODIUM LACTATE AND CALCIUM CHLORIDE: 600; 310; 30; 20 INJECTION, SOLUTION INTRAVENOUS at 09:39

## 2019-05-29 RX ADMIN — BUPIVACAINE HYDROCHLORIDE 60 ML: 2.5 INJECTION, SOLUTION EPIDURAL; INFILTRATION; INTRACAUDAL; PERINEURAL at 07:41

## 2019-05-29 RX ADMIN — PROPOFOL 25 MCG/KG/MIN: 10 INJECTION, EMULSION INTRAVENOUS at 07:39

## 2019-05-29 RX ADMIN — GLYCOPYRROLATE 0.2 MG: 0.2 INJECTION, SOLUTION INTRAMUSCULAR; INTRAVENOUS at 08:18

## 2019-05-29 RX ADMIN — SODIUM CHLORIDE, POTASSIUM CHLORIDE, SODIUM LACTATE AND CALCIUM CHLORIDE 9 ML/HR: 600; 310; 30; 20 INJECTION, SOLUTION INTRAVENOUS at 06:43

## 2019-05-29 RX ADMIN — ATRACURIUM BESYLATE 50 MG: 10 INJECTION, SOLUTION INTRAVENOUS at 07:39

## 2019-05-29 RX ADMIN — EPHEDRINE SULFATE 10 MG: 50 INJECTION INTRAMUSCULAR; INTRAVENOUS; SUBCUTANEOUS at 08:20

## 2019-05-30 LAB
ANION GAP SERPL CALCULATED.3IONS-SCNC: 11 MMOL/L
BUN BLD-MCNC: 6 MG/DL (ref 6–20)
BUN/CREAT SERPL: 7.1 (ref 7–25)
CALCIUM SPEC-SCNC: 8.1 MG/DL (ref 8.6–10.5)
CHLORIDE SERPL-SCNC: 99 MMOL/L (ref 98–107)
CO2 SERPL-SCNC: 26 MMOL/L (ref 22–29)
CREAT BLD-MCNC: 0.84 MG/DL (ref 0.57–1)
CYTO UR: NORMAL
DEPRECATED RDW RBC AUTO: 40.2 FL (ref 37–54)
ERYTHROCYTE [DISTWIDTH] IN BLOOD BY AUTOMATED COUNT: 13.2 % (ref 12.3–15.4)
GFR SERPL CREATININE-BSD FRML MDRD: 88 ML/MIN/1.73
GLUCOSE BLD-MCNC: 94 MG/DL (ref 65–99)
HCT VFR BLD AUTO: 30.1 % (ref 34–46.6)
HGB BLD-MCNC: 10.1 G/DL (ref 12–15.9)
LAB AP CASE REPORT: NORMAL
LAB AP CLINICAL INFORMATION: NORMAL
LAB AP DIAGNOSIS COMMENT: NORMAL
MCH RBC QN AUTO: 28.4 PG (ref 26.6–33)
MCHC RBC AUTO-ENTMCNC: 33.6 G/DL (ref 31.5–35.7)
MCV RBC AUTO: 84.6 FL (ref 79–97)
PATH REPORT.FINAL DX SPEC: NORMAL
PATH REPORT.GROSS SPEC: NORMAL
PLATELET # BLD AUTO: 250 10*3/MM3 (ref 140–450)
PMV BLD AUTO: 11.3 FL (ref 6–12)
POTASSIUM BLD-SCNC: 3.9 MMOL/L (ref 3.5–5.2)
RBC # BLD AUTO: 3.56 10*6/MM3 (ref 3.77–5.28)
SODIUM BLD-SCNC: 136 MMOL/L (ref 136–145)
WBC NRBC COR # BLD: 8.27 10*3/MM3 (ref 3.4–10.8)

## 2019-05-30 PROCEDURE — 85027 COMPLETE CBC AUTOMATED: CPT | Performed by: OBSTETRICS & GYNECOLOGY

## 2019-05-30 PROCEDURE — 25010000002 HEPARIN (PORCINE) PER 1000 UNITS: Performed by: OBSTETRICS & GYNECOLOGY

## 2019-05-30 PROCEDURE — 80048 BASIC METABOLIC PNL TOTAL CA: CPT | Performed by: OBSTETRICS & GYNECOLOGY

## 2019-05-30 RX ORDER — IBUPROFEN 400 MG/1
400 TABLET ORAL EVERY 6 HOURS PRN
Status: DISCONTINUED | OUTPATIENT
Start: 2019-05-30 | End: 2019-05-31 | Stop reason: HOSPADM

## 2019-05-30 RX ADMIN — HEPARIN SODIUM 5000 UNITS: 5000 INJECTION INTRAVENOUS; SUBCUTANEOUS at 22:32

## 2019-05-30 RX ADMIN — SODIUM CHLORIDE 100 ML/HR: 9 INJECTION, SOLUTION INTRAVENOUS at 22:32

## 2019-05-30 RX ADMIN — HEPARIN SODIUM 5000 UNITS: 5000 INJECTION INTRAVENOUS; SUBCUTANEOUS at 08:53

## 2019-05-30 RX ADMIN — HYDROCODONE BITARTRATE AND ACETAMINOPHEN 1 TABLET: 7.5; 325 TABLET ORAL at 09:00

## 2019-05-30 RX ADMIN — HYDROCODONE BITARTRATE AND ACETAMINOPHEN 1 TABLET: 7.5; 325 TABLET ORAL at 22:32

## 2019-05-30 RX ADMIN — HYDROCODONE BITARTRATE AND ACETAMINOPHEN 1 TABLET: 7.5; 325 TABLET ORAL at 14:22

## 2019-05-31 VITALS
SYSTOLIC BLOOD PRESSURE: 123 MMHG | TEMPERATURE: 98.8 F | RESPIRATION RATE: 18 BRPM | OXYGEN SATURATION: 96 % | WEIGHT: 184 LBS | DIASTOLIC BLOOD PRESSURE: 72 MMHG | BODY MASS INDEX: 29.57 KG/M2 | HEART RATE: 75 BPM | HEIGHT: 66 IN

## 2019-05-31 PROCEDURE — 25010000002 HEPARIN (PORCINE) PER 1000 UNITS: Performed by: OBSTETRICS & GYNECOLOGY

## 2019-05-31 RX ORDER — ONDANSETRON 4 MG/1
4 TABLET, FILM COATED ORAL EVERY 6 HOURS PRN
Qty: 15 TABLET | Refills: 0 | Status: SHIPPED | OUTPATIENT
Start: 2019-05-31 | End: 2019-09-20

## 2019-05-31 RX ADMIN — SODIUM CHLORIDE 100 ML/HR: 9 INJECTION, SOLUTION INTRAVENOUS at 06:30

## 2019-05-31 RX ADMIN — IBUPROFEN 400 MG: 400 TABLET ORAL at 10:23

## 2019-05-31 RX ADMIN — HYDROCODONE BITARTRATE AND ACETAMINOPHEN 1 TABLET: 7.5; 325 TABLET ORAL at 09:51

## 2019-05-31 RX ADMIN — HEPARIN SODIUM 5000 UNITS: 5000 INJECTION INTRAVENOUS; SUBCUTANEOUS at 09:51

## 2019-09-20 ENCOUNTER — OFFICE VISIT (OUTPATIENT)
Dept: INTERNAL MEDICINE | Facility: CLINIC | Age: 49
End: 2019-09-20

## 2019-09-20 VITALS
HEIGHT: 66 IN | WEIGHT: 168 LBS | DIASTOLIC BLOOD PRESSURE: 80 MMHG | SYSTOLIC BLOOD PRESSURE: 122 MMHG | TEMPERATURE: 98.7 F | BODY MASS INDEX: 27 KG/M2

## 2019-09-20 DIAGNOSIS — E55.9 VITAMIN D DEFICIENCY: ICD-10-CM

## 2019-09-20 DIAGNOSIS — Z00.00 HEALTH CARE MAINTENANCE: Primary | ICD-10-CM

## 2019-09-20 PROCEDURE — 80061 LIPID PANEL: CPT | Performed by: INTERNAL MEDICINE

## 2019-09-20 PROCEDURE — 82306 VITAMIN D 25 HYDROXY: CPT | Performed by: INTERNAL MEDICINE

## 2019-09-20 PROCEDURE — 99396 PREV VISIT EST AGE 40-64: CPT | Performed by: INTERNAL MEDICINE

## 2019-09-20 PROCEDURE — 80053 COMPREHEN METABOLIC PANEL: CPT | Performed by: INTERNAL MEDICINE

## 2019-09-20 PROCEDURE — 85027 COMPLETE CBC AUTOMATED: CPT | Performed by: INTERNAL MEDICINE

## 2019-09-20 NOTE — PROGRESS NOTES
Subjective   Marina Diaz is a 49 y.o. female here for annual exam. She is trying to lose weight and has lost about 20 lbs. No concerns today. She has gotten a STERLING since last visit. It was an open procedure. She had very large fibroids and had a complication with intraabdominal abscess which has since cleared. She starting to exercise again. No pain. No urinary issues.    Review of Systems   Constitutional: Negative.    HENT: Negative.    Eyes: Negative.    Respiratory: Negative.    Cardiovascular: Negative.    Gastrointestinal: Negative.    Endocrine: Negative.    Genitourinary: Negative.    Musculoskeletal: Negative.    Skin: Negative.    Allergic/Immunologic: Negative.    Neurological: Negative.    Hematological: Negative.    Psychiatric/Behavioral: Negative.        Past Medical History:   Diagnosis Date   • Arthritis    • Fibroids    • Wears glasses      Family History   Problem Relation Age of Onset   • Hypertension Mother    • Obesity Mother    • Cancer Other    • Diabetes Other      Past Surgical History:   Procedure Laterality Date   • BREAST LUMPECTOMY Bilateral 2007   • COLONOSCOPY  2018   • FOOT SURGERY     • TOTAL ABDOMINAL HYSTERECTOMY N/A 5/29/2019    Procedure: TOTAL ABDOMINAL HYSTERECTOMY WITH BILATERAL SALPINGECTOMY;  Surgeon: Marjorie Deng MD;  Location: [facility];  Service: Obstetrics/Gynecology     Social History     Socioeconomic History   • Marital status: Single     Spouse name: Not on file   • Number of children: Not on file   • Years of education: Not on file   • Highest education level: Not on file   Tobacco Use   • Smoking status: Never Smoker   • Smokeless tobacco: Never Used   Substance and Sexual Activity   • Alcohol use: Yes     Comment: Rare   • Drug use: No   • Sexual activity: Defer         Current Outpatient Medications:   •  Cholecalciferol (VITAMIN D3) 2000 UNITS tablet, Take 2,000 Units by mouth Daily., Disp: , Rfl:   •  Multiple Vitamins-Minerals  "(MULTIVITAMIN ADULT PO), Take 1 tablet by mouth Daily., Disp: , Rfl:     Objective   /80 (BP Location: Left arm, Patient Position: Sitting, Cuff Size: Large Adult)   Temp 98.7 °F (37.1 °C) (Temporal)   Ht 167.6 cm (66\")   Wt 76.2 kg (168 lb)   BMI 27.12 kg/m²   Physical Exam   Constitutional: She is oriented to person, place, and time. She appears well-developed and well-nourished. No distress.   HENT:   Head: Normocephalic and atraumatic.   Right Ear: Tympanic membrane, external ear and ear canal normal.   Left Ear: Tympanic membrane, external ear and ear canal normal.   Nose: Nose normal.   Mouth/Throat: Oropharynx is clear and moist.   Eyes: Conjunctivae and lids are normal. Pupils are equal, round, and reactive to light. No scleral icterus.   Neck: Neck supple. Carotid bruit is not present. No thyroid mass and no thyromegaly present.   Cardiovascular: Normal rate, regular rhythm, normal heart sounds and intact distal pulses. Exam reveals no gallop, no S3, no S4 and no friction rub.   No murmur heard.  Pulmonary/Chest: Effort normal and breath sounds normal. No respiratory distress. She has no wheezes. She has no rhonchi. She has no rales.   Abdominal: Soft. Bowel sounds are normal. She exhibits no distension and no mass. There is no hepatosplenomegaly. There is no tenderness.   Musculoskeletal: Normal range of motion.   Lymphadenopathy:     She has no cervical adenopathy.   Neurological: She is alert and oriented to person, place, and time. No cranial nerve deficit or sensory deficit.   Skin: Skin is warm and dry. No rash noted. No erythema. No pallor.        Healing surgical scar. Purple, slightly raised. No signs of infection   Psychiatric: She has a normal mood and affect. Her speech is normal and behavior is normal. Judgment and thought content normal. Cognition and memory are normal.   Vitals reviewed.      Assessment/Plan   Marina was seen today for annual exam.    Diagnoses and all orders for " this visit:    Health care maintenance  -     CBC (No Diff)  -     Comprehensive Metabolic Panel  -     Lipid Panel    Vitamin D deficiency  -     Vitamin D 25 Hydroxy        1. HCM  -pap done by gyn, now won't require them with STERLING. Does have ovaries.  -mamm UTD/at 51  -cscope at 51, due to AA heritage, will get with pt and inquire if she wants early cscope as she would qualify for that  -fasting labs today  -defers flu vaccine  Reviewed the following with the patient: advised patient of need for:  mammogram, colonoscopy, immunizations discussed and influenza vaccine, encouraged patient to exercise 5-7 days per week for 30 minutes at a time, ideal body weight discussed with patient and weight loss encouraged. Discussed healthy diet, calorie counting, increasing exercise to lose weight and to improve physical fitness.

## 2019-09-21 LAB
25(OH)D3 SERPL-MCNC: 50.9 NG/ML (ref 30–100)
ALBUMIN SERPL-MCNC: 4.3 G/DL (ref 3.5–5.2)
ALBUMIN/GLOB SERPL: 1.3 G/DL
ALP SERPL-CCNC: 47 U/L (ref 39–117)
ALT SERPL W P-5'-P-CCNC: 12 U/L (ref 1–33)
ANION GAP SERPL CALCULATED.3IONS-SCNC: 12.5 MMOL/L (ref 5–15)
AST SERPL-CCNC: 21 U/L (ref 1–32)
BILIRUB SERPL-MCNC: 0.4 MG/DL (ref 0.2–1.2)
BUN BLD-MCNC: 13 MG/DL (ref 6–20)
BUN/CREAT SERPL: 15.5 (ref 7–25)
CALCIUM SPEC-SCNC: 9.6 MG/DL (ref 8.6–10.5)
CHLORIDE SERPL-SCNC: 101 MMOL/L (ref 98–107)
CHOLEST SERPL-MCNC: 234 MG/DL (ref 0–200)
CO2 SERPL-SCNC: 25.5 MMOL/L (ref 22–29)
CREAT BLD-MCNC: 0.84 MG/DL (ref 0.57–1)
DEPRECATED RDW RBC AUTO: 44.4 FL (ref 37–54)
ERYTHROCYTE [DISTWIDTH] IN BLOOD BY AUTOMATED COUNT: 15.1 % (ref 12.3–15.4)
GFR SERPL CREATININE-BSD FRML MDRD: 88 ML/MIN/1.73
GLOBULIN UR ELPH-MCNC: 3.4 GM/DL
GLUCOSE BLD-MCNC: 75 MG/DL (ref 65–99)
HCT VFR BLD AUTO: 37.9 % (ref 34–46.6)
HDLC SERPL-MCNC: 67 MG/DL (ref 40–60)
HGB BLD-MCNC: 12.6 G/DL (ref 12–15.9)
LDLC SERPL CALC-MCNC: 150 MG/DL (ref 0–100)
LDLC/HDLC SERPL: 2.24 {RATIO}
MCH RBC QN AUTO: 26.9 PG (ref 26.6–33)
MCHC RBC AUTO-ENTMCNC: 33.2 G/DL (ref 31.5–35.7)
MCV RBC AUTO: 81 FL (ref 79–97)
PLATELET # BLD AUTO: 269 10*3/MM3 (ref 140–450)
PMV BLD AUTO: 12.2 FL (ref 6–12)
POTASSIUM BLD-SCNC: 3.9 MMOL/L (ref 3.5–5.2)
PROT SERPL-MCNC: 7.7 G/DL (ref 6–8.5)
RBC # BLD AUTO: 4.68 10*6/MM3 (ref 3.77–5.28)
SODIUM BLD-SCNC: 139 MMOL/L (ref 136–145)
TRIGL SERPL-MCNC: 84 MG/DL (ref 0–150)
VLDLC SERPL-MCNC: 16.8 MG/DL (ref 5–40)
WBC NRBC COR # BLD: 4.32 10*3/MM3 (ref 3.4–10.8)

## 2019-11-11 ENCOUNTER — OFFICE VISIT (OUTPATIENT)
Dept: INTERNAL MEDICINE | Facility: CLINIC | Age: 49
End: 2019-11-11

## 2019-11-11 VITALS
HEIGHT: 66 IN | WEIGHT: 174 LBS | TEMPERATURE: 97.8 F | DIASTOLIC BLOOD PRESSURE: 68 MMHG | BODY MASS INDEX: 27.97 KG/M2 | SYSTOLIC BLOOD PRESSURE: 122 MMHG

## 2019-11-11 DIAGNOSIS — M54.50 ACUTE BILATERAL LOW BACK PAIN WITHOUT SCIATICA: Primary | ICD-10-CM

## 2019-11-11 DIAGNOSIS — M43.16 SPONDYLOLISTHESIS AT L4-L5 LEVEL: ICD-10-CM

## 2019-11-11 PROCEDURE — 99213 OFFICE O/P EST LOW 20 MIN: CPT | Performed by: INTERNAL MEDICINE

## 2019-11-11 PROCEDURE — 96372 THER/PROPH/DIAG INJ SC/IM: CPT | Performed by: INTERNAL MEDICINE

## 2019-11-11 RX ORDER — KETOROLAC TROMETHAMINE 30 MG/ML
30 INJECTION, SOLUTION INTRAMUSCULAR; INTRAVENOUS ONCE
Status: COMPLETED | OUTPATIENT
Start: 2019-11-11 | End: 2019-11-11

## 2019-11-11 RX ORDER — CYCLOBENZAPRINE HCL 10 MG
10 TABLET ORAL 3 TIMES DAILY PRN
Qty: 30 TABLET | Refills: 2 | Status: SHIPPED | OUTPATIENT
Start: 2019-11-11 | End: 2021-09-29

## 2019-11-11 RX ADMIN — KETOROLAC TROMETHAMINE 30 MG: 30 INJECTION, SOLUTION INTRAMUSCULAR; INTRAVENOUS at 08:45

## 2019-12-12 ENCOUNTER — TELEPHONE (OUTPATIENT)
Dept: INTERNAL MEDICINE | Facility: CLINIC | Age: 49
End: 2019-12-12

## 2019-12-12 NOTE — TELEPHONE ENCOUNTER
Was seen over a month ago for lower back. Did therapy for 3 or 4 weeks and it helped a little but pt still has a lot pain. Pt would like to know if there is something else that can be done or does she need to come back in to see the dr? Please call [phone] and advise.

## 2019-12-16 NOTE — TELEPHONE ENCOUNTER
LEFT MESSAGE STATING PT NEEDED TO BE SEEN AND THAT SHE WAS PUT ON THE SCHEDULE FOR TOMORROW 12/17/2019. AND TO CALL IF THIS TIME WOULD NOT WORK.

## 2019-12-17 ENCOUNTER — HOSPITAL ENCOUNTER (OUTPATIENT)
Dept: GENERAL RADIOLOGY | Facility: HOSPITAL | Age: 49
Discharge: HOME | End: 2019-12-17
Admitting: INTERNAL MEDICINE

## 2019-12-17 ENCOUNTER — OFFICE VISIT (OUTPATIENT)
Dept: INTERNAL MEDICINE | Facility: CLINIC | Age: 49
End: 2019-12-17

## 2019-12-17 VITALS
WEIGHT: 175 LBS | TEMPERATURE: 97.8 F | DIASTOLIC BLOOD PRESSURE: 68 MMHG | SYSTOLIC BLOOD PRESSURE: 122 MMHG | BODY MASS INDEX: 28.12 KG/M2 | HEIGHT: 66 IN

## 2019-12-17 DIAGNOSIS — M51.36 LUMBAR DEGENERATIVE DISC DISEASE: Primary | ICD-10-CM

## 2019-12-17 DIAGNOSIS — M43.16 SPONDYLOLISTHESIS AT L4-L5 LEVEL: ICD-10-CM

## 2019-12-17 PROCEDURE — 72100 X-RAY EXAM L-S SPINE 2/3 VWS: CPT

## 2019-12-17 PROCEDURE — 99214 OFFICE O/P EST MOD 30 MIN: CPT | Performed by: INTERNAL MEDICINE

## 2019-12-17 RX ORDER — MELOXICAM 15 MG/1
15 TABLET ORAL DAILY
Qty: 30 TABLET | Refills: 0 | Status: SHIPPED | OUTPATIENT
Start: 2019-12-17 | End: 2021-09-29

## 2019-12-17 NOTE — PROGRESS NOTES
"Subjective   Marina Diaz is a 49 y.o. female here for low back pain.  She had a flare of her low back pain about 3 months ago.  She has had back pain ever since before 2016.  2016 was the last time she had any imaging done.  At that time, an x-ray of her lumbar spine showed degenerative changes and spondylolisthesis.  She says since about 3 months ago she is having dull aching in the lumbar spine midline radiating to either side.  She also has a sore type of feeling deep in each side of her buttock.  The pain does radiate from the low back into the buttock but does not go down the leg.  There are no neurological symptoms.  It is more of a nagging pain that is just really bothering her.  She does sit down for most of the day during her job.  She does not get much exercise.  No spinal surgical history.    I have reviewed the following portions of the patient's history and confirmed they are accurate: current medications, past medical history, past social history, past surgical history and problem list     I have personally completed the patient's review of systems.    Review of Systems:  General: negative  Neuro: negative  MSK: See HPI  Skin: Negative    Objective   /68 (BP Location: Left arm, Patient Position: Sitting, Cuff Size: Large Adult)   Temp 97.8 °F (36.6 °C) (Temporal)   Ht 167.6 cm (66\")   Wt 79.4 kg (175 lb)   BMI 28.25 kg/m²     Physical Exam   Constitutional: She is oriented to person, place, and time. She appears well-developed and well-nourished.   Pulmonary/Chest: Effort normal.   Musculoskeletal:        Lumbar back: She exhibits normal range of motion, no tenderness, no swelling, no edema and no deformity.   Neurological: She is alert and oriented to person, place, and time.   Skin: Skin is warm and dry.   Psychiatric: She has a normal mood and affect. Her behavior is normal. Judgment and thought content normal.   Vitals reviewed.      Assessment/Plan   Marina was seen today " for back pain.    Diagnoses and all orders for this visit:    Lumbar degenerative disc disease  -     XR Spine Lumbar 2 or 3 View  -     meloxicam (MOBIC) 15 MG tablet; Take 1 tablet by mouth Daily.  -New problem requiring work-up and treatment.  Also advised stretching exercises    Spondylolisthesis at L4-L5 level  -     XR Spine Lumbar 2 or 3 View  -     meloxicam (MOBIC) 15 MG tablet; Take 1 tablet by mouth Daily.  -Worsening, requiring work-up             Angela Waddell MA    Please note that portions of this note were completed with a voice recognition program. Efforts were made to edit the dictations, but occasionally words are mistranscribed.

## 2019-12-18 ENCOUNTER — TELEPHONE (OUTPATIENT)
Dept: INTERNAL MEDICINE | Facility: CLINIC | Age: 49
End: 2019-12-18

## 2019-12-18 NOTE — TELEPHONE ENCOUNTER
Patient advised. She wants to see if the exercises and stretches will help before going to pain mgmt

## 2019-12-18 NOTE — TELEPHONE ENCOUNTER
----- Message from Shu Stokes MD sent at 12/18/2019  9:37 AM EST -----  Please call the patient regarding her abnormal result.  Tell her that the x-ray showed degenerative changes and severe arthritis in the lumbar spine.  This is what is causing her issue.  It is not a surgical problem, but it will not get any better.  I would recommend her doing strengthening exercises for her lower back and stretching to minimize pain.  If the pain gets worse, we can always do pain management as we talked about when she was here.  She wanted more of a what to expect outlook, and I think this is going to be a chronic thing that she is going to have to deal with from now on.

## 2020-09-29 ENCOUNTER — LAB (OUTPATIENT)
Dept: LAB | Facility: HOSPITAL | Age: 50
End: 2020-09-29

## 2020-09-29 ENCOUNTER — OFFICE VISIT (OUTPATIENT)
Dept: INTERNAL MEDICINE | Facility: CLINIC | Age: 50
End: 2020-09-29

## 2020-09-29 VITALS
SYSTOLIC BLOOD PRESSURE: 124 MMHG | TEMPERATURE: 97.1 F | DIASTOLIC BLOOD PRESSURE: 72 MMHG | WEIGHT: 197 LBS | HEIGHT: 66 IN | BODY MASS INDEX: 31.66 KG/M2

## 2020-09-29 DIAGNOSIS — E55.9 VITAMIN D DEFICIENCY: ICD-10-CM

## 2020-09-29 DIAGNOSIS — Z11.59 ENCOUNTER FOR HEPATITIS C SCREENING TEST FOR LOW RISK PATIENT: ICD-10-CM

## 2020-09-29 DIAGNOSIS — Z00.00 HEALTH CARE MAINTENANCE: Primary | ICD-10-CM

## 2020-09-29 DIAGNOSIS — R63.5 UNEXPLAINED WEIGHT GAIN: ICD-10-CM

## 2020-09-29 LAB
ALBUMIN SERPL-MCNC: 4.1 G/DL (ref 3.5–5.2)
ALBUMIN/GLOB SERPL: 1.3 G/DL
ALP SERPL-CCNC: 52 U/L (ref 39–117)
ALT SERPL W P-5'-P-CCNC: 15 U/L (ref 1–33)
ANION GAP SERPL CALCULATED.3IONS-SCNC: 7.9 MMOL/L (ref 5–15)
AST SERPL-CCNC: 19 U/L (ref 1–32)
BILIRUB SERPL-MCNC: 0.3 MG/DL (ref 0–1.2)
BUN SERPL-MCNC: 15 MG/DL (ref 6–20)
BUN/CREAT SERPL: 17 (ref 7–25)
CALCIUM SPEC-SCNC: 8.8 MG/DL (ref 8.6–10.5)
CHLORIDE SERPL-SCNC: 102 MMOL/L (ref 98–107)
CHOLEST SERPL-MCNC: 218 MG/DL (ref 0–200)
CO2 SERPL-SCNC: 27.1 MMOL/L (ref 22–29)
CREAT SERPL-MCNC: 0.88 MG/DL (ref 0.57–1)
DEPRECATED RDW RBC AUTO: 38.5 FL (ref 37–54)
ERYTHROCYTE [DISTWIDTH] IN BLOOD BY AUTOMATED COUNT: 12.8 % (ref 12.3–15.4)
GFR SERPL CREATININE-BSD FRML MDRD: 83 ML/MIN/1.73
GLOBULIN UR ELPH-MCNC: 3.1 GM/DL
GLUCOSE SERPL-MCNC: 81 MG/DL (ref 65–99)
HCT VFR BLD AUTO: 35.8 % (ref 34–46.6)
HDLC SERPL-MCNC: 62 MG/DL (ref 40–60)
HGB BLD-MCNC: 12.1 G/DL (ref 12–15.9)
LDLC SERPL CALC-MCNC: 132 MG/DL (ref 0–100)
LDLC/HDLC SERPL: 2.12 {RATIO}
MCH RBC QN AUTO: 28.3 PG (ref 26.6–33)
MCHC RBC AUTO-ENTMCNC: 33.8 G/DL (ref 31.5–35.7)
MCV RBC AUTO: 83.6 FL (ref 79–97)
PLATELET # BLD AUTO: 250 10*3/MM3 (ref 140–450)
PMV BLD AUTO: 12 FL (ref 6–12)
POTASSIUM SERPL-SCNC: 4.3 MMOL/L (ref 3.5–5.2)
PROT SERPL-MCNC: 7.2 G/DL (ref 6–8.5)
RBC # BLD AUTO: 4.28 10*6/MM3 (ref 3.77–5.28)
SODIUM SERPL-SCNC: 137 MMOL/L (ref 136–145)
TRIGL SERPL-MCNC: 122 MG/DL (ref 0–150)
VLDLC SERPL-MCNC: 24.4 MG/DL (ref 5–40)
WBC # BLD AUTO: 5.58 10*3/MM3 (ref 3.4–10.8)

## 2020-09-29 PROCEDURE — 99396 PREV VISIT EST AGE 40-64: CPT | Performed by: INTERNAL MEDICINE

## 2020-09-29 PROCEDURE — 82306 VITAMIN D 25 HYDROXY: CPT | Performed by: INTERNAL MEDICINE

## 2020-09-29 PROCEDURE — 86803 HEPATITIS C AB TEST: CPT | Performed by: INTERNAL MEDICINE

## 2020-09-29 PROCEDURE — 85027 COMPLETE CBC AUTOMATED: CPT | Performed by: INTERNAL MEDICINE

## 2020-09-29 PROCEDURE — 80061 LIPID PANEL: CPT | Performed by: INTERNAL MEDICINE

## 2020-09-29 PROCEDURE — 84443 ASSAY THYROID STIM HORMONE: CPT | Performed by: INTERNAL MEDICINE

## 2020-09-29 PROCEDURE — 80053 COMPREHEN METABOLIC PANEL: CPT | Performed by: INTERNAL MEDICINE

## 2020-09-29 NOTE — PROGRESS NOTES
Subjective   Marina Diaz is a 50 y.o. female here for annual exam. She has gained 30 lbs since beginning of the year and hasn't changed her diet much. Not exercising like before but planning to buy an elliptical. She had partial hysterectomy, ovaries intact. Has had hot flashes (not severe) last few months.    Review of Systems   Constitutional: Positive for unexpected weight change.   HENT: Negative.    Eyes: Negative.    Respiratory: Negative.    Cardiovascular: Negative.    Gastrointestinal: Negative.    Endocrine:        Hot flashes   Genitourinary: Negative.    Musculoskeletal: Negative.    Skin: Negative.    Allergic/Immunologic: Negative.    Neurological: Negative.    Hematological: Negative.    Psychiatric/Behavioral: Negative.           Past Medical History:   Diagnosis Date   • Arthritis    • Fibroids    • Wears glasses      Family History   Problem Relation Age of Onset   • Hypertension Mother    • Obesity Mother    • Cancer Other    • Diabetes Other      Past Surgical History:   Procedure Laterality Date   • BREAST LUMPECTOMY Bilateral 2007   • COLONOSCOPY  2018   • FOOT SURGERY     • TOTAL ABDOMINAL HYSTERECTOMY N/A 5/29/2019    Procedure: TOTAL ABDOMINAL HYSTERECTOMY WITH BILATERAL SALPINGECTOMY;  Surgeon: Marjorie Deng MD;  Location: [facility];  Service: Obstetrics/Gynecology     Social History     Socioeconomic History   • Marital status: Single     Spouse name: Not on file   • Number of children: Not on file   • Years of education: Not on file   • Highest education level: Not on file   Tobacco Use   • Smoking status: Never Smoker   • Smokeless tobacco: Never Used   Substance and Sexual Activity   • Alcohol use: Yes     Comment: Rare   • Drug use: No   • Sexual activity: Defer         Current Outpatient Medications:   •  Cholecalciferol (VITAMIN D3) 2000 UNITS tablet, Take 2,000 Units by mouth Daily., Disp: , Rfl:   •  cyclobenzaprine (FLEXERIL) 10 MG tablet, Take 1 tablet by  "mouth 3 (Three) Times a Day As Needed for Muscle Spasms., Disp: 30 tablet, Rfl: 2  •  meloxicam (MOBIC) 15 MG tablet, Take 1 tablet by mouth Daily., Disp: 30 tablet, Rfl: 0  •  Multiple Vitamins-Minerals (MULTIVITAMIN ADULT PO), Take 1 tablet by mouth Daily., Disp: , Rfl:     Objective   /72 (BP Location: Left arm, Patient Position: Sitting, Cuff Size: Large Adult)   Temp 97.1 °F (36.2 °C) (Temporal)   Ht 167.6 cm (66\")   Wt 89.4 kg (197 lb)   BMI 31.80 kg/m²   Physical Exam  Vitals signs reviewed.   Constitutional:       General: She is not in acute distress.     Appearance: She is well-developed.   HENT:      Head: Normocephalic and atraumatic.      Right Ear: Tympanic membrane, ear canal and external ear normal.      Left Ear: Tympanic membrane, ear canal and external ear normal.      Nose: Nose normal.   Eyes:      General: Lids are normal. No scleral icterus.     Conjunctiva/sclera: Conjunctivae normal.      Pupils: Pupils are equal, round, and reactive to light.   Neck:      Musculoskeletal: Neck supple.      Thyroid: No thyroid mass or thyromegaly.      Vascular: No carotid bruit.   Cardiovascular:      Rate and Rhythm: Normal rate and regular rhythm.      Heart sounds: Normal heart sounds. No murmur. No friction rub. No gallop. No S3 or S4 sounds.    Pulmonary:      Effort: Pulmonary effort is normal. No respiratory distress.      Breath sounds: Normal breath sounds. No wheezing, rhonchi or rales.   Abdominal:      General: Bowel sounds are normal. There is no distension.      Palpations: Abdomen is soft. There is no mass.      Tenderness: There is no abdominal tenderness.   Musculoskeletal: Normal range of motion.   Lymphadenopathy:      Cervical: No cervical adenopathy.   Skin:     General: Skin is warm and dry.      Coloration: Skin is not pale.      Findings: No erythema or rash.   Neurological:      Mental Status: She is alert and oriented to person, place, and time.      Cranial Nerves: No " cranial nerve deficit.      Sensory: No sensory deficit.   Psychiatric:         Speech: Speech normal.         Behavior: Behavior normal.         Thought Content: Thought content normal.         Judgment: Judgment normal.         Assessment/Plan   Marina was seen today for annual exam.    Diagnoses and all orders for this visit:    Health care maintenance  -     Comprehensive Metabolic Panel  -     CBC (No Diff)  -     Lipid Panel    Unexplained weight gain  -     TSH  -discussed it may be related to menopause given her hot flashes. Advised low calorie diet and to use [employer] to log whatever she eats. Also advised she increase exercise as most people have been more sedentary working at home during pandemic.    Encounter for hepatitis C screening test for low risk patient  -     Hepatitis C Antibody    Vitamin D deficiency  -     Vitamin D 25 Hydroxy      1. HCM  -pap not indicated, partial hysterectomy. Does follow with gyn  -mamm UTD  -cscope UTD, 2y ago. [facility]  -fasting labs today  Reviewed the following with the patient: advised patient of need for:  pap smear, mammogram, colonoscopy, immunizations discussed, influenza vaccine, Adacel-Tdap vaccine and shingrix at 51, encouraged patient to exercise 5-7 days per week for 30 minutes at a time, ideal body weight discussed with patient and weight loss encouraged.       Counseled regarding: age-appropriate screening labs and tests, wearing seatbelt and sunscreen regularly  Discussed: regular exercise and diet changes to promote healthy weight, checking skin regularly for abnormal moles and lesions    Please note that portions of this note were completed with a voice recognition program. Efforts were made to edit the dictations, but occasionally words are mistranscribed.

## 2020-09-30 LAB
25(OH)D3 SERPL-MCNC: 40.5 NG/ML (ref 30–100)
HCV AB SER DONR QL: NORMAL
TSH SERPL DL<=0.05 MIU/L-ACNC: 1.46 UIU/ML (ref 0.27–4.2)

## 2021-09-29 ENCOUNTER — OFFICE VISIT (OUTPATIENT)
Dept: INTERNAL MEDICINE | Facility: CLINIC | Age: 51
End: 2021-09-29

## 2021-09-29 ENCOUNTER — LAB (OUTPATIENT)
Dept: LAB | Facility: HOSPITAL | Age: 51
End: 2021-09-29

## 2021-09-29 VITALS
SYSTOLIC BLOOD PRESSURE: 122 MMHG | BODY MASS INDEX: 29.57 KG/M2 | WEIGHT: 184 LBS | HEIGHT: 66 IN | DIASTOLIC BLOOD PRESSURE: 74 MMHG | TEMPERATURE: 97.3 F | OXYGEN SATURATION: 98 % | HEART RATE: 50 BPM

## 2021-09-29 DIAGNOSIS — Z00.00 HEALTH CARE MAINTENANCE: Primary | ICD-10-CM

## 2021-09-29 DIAGNOSIS — Z11.3 ROUTINE SCREENING FOR STI (SEXUALLY TRANSMITTED INFECTION): ICD-10-CM

## 2021-09-29 LAB
25(OH)D3 SERPL-MCNC: 42.9 NG/ML
ALBUMIN SERPL-MCNC: 4.2 G/DL (ref 3.5–5.2)
ALBUMIN/GLOB SERPL: 1.4 G/DL
ALP SERPL-CCNC: 45 U/L (ref 39–117)
ALT SERPL W P-5'-P-CCNC: 12 U/L (ref 1–33)
ANION GAP SERPL CALCULATED.3IONS-SCNC: 9.9 MMOL/L (ref 5–15)
AST SERPL-CCNC: 18 U/L (ref 1–32)
BILIRUB SERPL-MCNC: 0.2 MG/DL (ref 0–1.2)
BUN SERPL-MCNC: 14 MG/DL (ref 6–20)
BUN/CREAT SERPL: 16.3 (ref 7–25)
CALCIUM SPEC-SCNC: 9 MG/DL (ref 8.6–10.5)
CHLORIDE SERPL-SCNC: 101 MMOL/L (ref 98–107)
CHOLEST SERPL-MCNC: 219 MG/DL (ref 0–200)
CO2 SERPL-SCNC: 26.1 MMOL/L (ref 22–29)
CREAT SERPL-MCNC: 0.86 MG/DL (ref 0.57–1)
DEPRECATED RDW RBC AUTO: 39.4 FL (ref 37–54)
ERYTHROCYTE [DISTWIDTH] IN BLOOD BY AUTOMATED COUNT: 12.9 % (ref 12.3–15.4)
GFR SERPL CREATININE-BSD FRML MDRD: 85 ML/MIN/1.73
GLOBULIN UR ELPH-MCNC: 3 GM/DL
GLUCOSE SERPL-MCNC: 73 MG/DL (ref 65–99)
HAV IGM SERPL QL IA: NORMAL
HBV CORE IGM SERPL QL IA: NORMAL
HBV SURFACE AG SERPL QL IA: NORMAL
HCT VFR BLD AUTO: 35.4 % (ref 34–46.6)
HCV AB SER DONR QL: NORMAL
HDLC SERPL-MCNC: 63 MG/DL (ref 40–60)
HGB BLD-MCNC: 11.9 G/DL (ref 12–15.9)
HIV1+2 AB SER QL: NORMAL
LDLC SERPL CALC-MCNC: 144 MG/DL (ref 0–100)
LDLC/HDLC SERPL: 2.27 {RATIO}
MCH RBC QN AUTO: 28.4 PG (ref 26.6–33)
MCHC RBC AUTO-ENTMCNC: 33.6 G/DL (ref 31.5–35.7)
MCV RBC AUTO: 84.5 FL (ref 79–97)
PLATELET # BLD AUTO: 266 10*3/MM3 (ref 140–450)
PMV BLD AUTO: 11.3 FL (ref 6–12)
POTASSIUM SERPL-SCNC: 4.1 MMOL/L (ref 3.5–5.2)
PROT SERPL-MCNC: 7.2 G/DL (ref 6–8.5)
RBC # BLD AUTO: 4.19 10*6/MM3 (ref 3.77–5.28)
SODIUM SERPL-SCNC: 137 MMOL/L (ref 136–145)
TRIGL SERPL-MCNC: 66 MG/DL (ref 0–150)
VIT B12 BLD-MCNC: 377 PG/ML (ref 211–946)
VLDLC SERPL-MCNC: 12 MG/DL (ref 5–40)
WBC # BLD AUTO: 5.18 10*3/MM3 (ref 3.4–10.8)

## 2021-09-29 PROCEDURE — 80074 ACUTE HEPATITIS PANEL: CPT | Performed by: INTERNAL MEDICINE

## 2021-09-29 PROCEDURE — 82306 VITAMIN D 25 HYDROXY: CPT | Performed by: INTERNAL MEDICINE

## 2021-09-29 PROCEDURE — 82607 VITAMIN B-12: CPT | Performed by: INTERNAL MEDICINE

## 2021-09-29 PROCEDURE — 80053 COMPREHEN METABOLIC PANEL: CPT | Performed by: INTERNAL MEDICINE

## 2021-09-29 PROCEDURE — 80061 LIPID PANEL: CPT | Performed by: INTERNAL MEDICINE

## 2021-09-29 PROCEDURE — G0432 EIA HIV-1/HIV-2 SCREEN: HCPCS | Performed by: INTERNAL MEDICINE

## 2021-09-29 PROCEDURE — 99396 PREV VISIT EST AGE 40-64: CPT | Performed by: INTERNAL MEDICINE

## 2021-09-29 PROCEDURE — 84443 ASSAY THYROID STIM HORMONE: CPT | Performed by: INTERNAL MEDICINE

## 2021-09-29 PROCEDURE — 36415 COLL VENOUS BLD VENIPUNCTURE: CPT | Performed by: INTERNAL MEDICINE

## 2021-09-29 PROCEDURE — 85027 COMPLETE CBC AUTOMATED: CPT | Performed by: INTERNAL MEDICINE

## 2021-09-29 RX ORDER — CLOBETASOL PROPIONATE 0.5 MG/G
OINTMENT TOPICAL
COMMUNITY
End: 2022-06-27

## 2021-09-29 RX ORDER — FLUCONAZOLE 150 MG/1
TABLET ORAL
COMMUNITY
Start: 2021-09-08 | End: 2022-06-27

## 2021-09-29 NOTE — PROGRESS NOTES
"Subjective   Marina Diaz is a 51 y.o. female here for follow-up      I have reviewed the following portions of the patient's history and confirmed they are accurate: past medical history     I have personally reviewed and performed the ROS. Shu Stokes MD     Review of Systems:  General: negative  CV: negative  Respiratory: negative  Neuro: negative  Psych: negative    Objective   /74   Pulse 50   Temp 97.3 °F (36.3 °C) (Temporal)   Ht 167.6 cm (66\")   Wt 83.5 kg (184 lb)   SpO2 98%   BMI 29.70 kg/m²     Physical Exam    Assessment/Plan   Diagnoses and all orders for this visit:    1. Health care maintenance (Primary)  -     CBC (No Diff)  -     Comprehensive Metabolic Panel  -     Lipid Panel  -     Vitamin B12  -     Vitamin D 25 Hydroxy  -     TSH Rfx On Abnormal To Free T4    2. Routine screening for STI (sexually transmitted infection)  -     HIV-1/O/2 Ag/Ab w Reflex  -     Hepatitis Panel, Acute             Shu Stokes MD    Please note that portions of this note were completed with a voice recognition program. Efforts were made to edit the dictations, but occasionally words are mistranscribed.  "

## 2021-09-29 NOTE — PROGRESS NOTES
Subjective   Marina Diaz is a 51 y.o. female here for yearly physical.    Review of Systems   Constitutional: Negative.    HENT: Negative.    Eyes: Negative.    Respiratory: Negative.    Cardiovascular: Negative.    Gastrointestinal: Negative.    Endocrine: Negative.    Genitourinary: Negative.    Musculoskeletal: Negative.    Skin: Negative.    Allergic/Immunologic: Negative.    Neurological: Negative.    Hematological: Negative.    Psychiatric/Behavioral: Negative.           Past Medical History:   Diagnosis Date   • Arthritis    • Fibroids    • Wears glasses      Family History   Problem Relation Age of Onset   • Hypertension Mother    • Obesity Mother    • Cancer Other    • Diabetes Other      Past Surgical History:   Procedure Laterality Date   • BREAST LUMPECTOMY Bilateral 2007   • COLONOSCOPY  2018   • FOOT SURGERY     • TOTAL ABDOMINAL HYSTERECTOMY N/A 5/29/2019    Procedure: TOTAL ABDOMINAL HYSTERECTOMY WITH BILATERAL SALPINGECTOMY;  Surgeon: Marjorie Deng MD;  Location: [facility];  Service: Obstetrics/Gynecology     Social History     Socioeconomic History   • Marital status: Single     Spouse name: Not on file   • Number of children: Not on file   • Years of education: Not on file   • Highest education level: Not on file   Tobacco Use   • Smoking status: Never Smoker   • Smokeless tobacco: Never Used   Substance and Sexual Activity   • Alcohol use: Yes     Comment: Rare   • Drug use: No   • Sexual activity: Defer         Current Outpatient Medications:   •  Cholecalciferol (VITAMIN D3) 2000 UNITS tablet, Take 2,000 Units by mouth Daily., Disp: , Rfl:   •  clobetasol (TEMOVATE) 0.05 % ointment, clobetasol 0.05 % topical ointment, Disp: , Rfl:   •  fluconazole (DIFLUCAN) 150 MG tablet, , Disp: , Rfl:   •  Multiple Vitamins-Minerals (MULTIVITAMIN ADULT PO), Take 1 tablet by mouth Daily., Disp: , Rfl:   •  Probiotic Product (PROBIOTIC DAILY PO), Probiotic, Disp: , Rfl:     Objective  "  /74   Pulse 50   Temp 97.3 °F (36.3 °C) (Temporal)   Ht 167.6 cm (66\")   Wt 83.5 kg (184 lb)   SpO2 98%   BMI 29.70 kg/m²   Physical Exam  Vitals reviewed.   Constitutional:       General: She is not in acute distress.     Appearance: She is well-developed.   HENT:      Head: Normocephalic and atraumatic.      Right Ear: Tympanic membrane, ear canal and external ear normal.      Left Ear: Tympanic membrane, ear canal and external ear normal.      Nose: Nose normal.   Eyes:      General: Lids are normal. No scleral icterus.     Conjunctiva/sclera: Conjunctivae normal.      Pupils: Pupils are equal, round, and reactive to light.   Neck:      Thyroid: No thyroid mass or thyromegaly.      Vascular: No carotid bruit.   Cardiovascular:      Rate and Rhythm: Normal rate and regular rhythm.      Heart sounds: Normal heart sounds. No murmur heard.   No friction rub. No gallop. No S3 or S4 sounds.    Pulmonary:      Effort: Pulmonary effort is normal. No respiratory distress.      Breath sounds: Normal breath sounds. No wheezing, rhonchi or rales.   Abdominal:      General: Bowel sounds are normal. There is no distension.      Palpations: Abdomen is soft. There is no mass.      Tenderness: There is no abdominal tenderness.   Musculoskeletal:         General: Normal range of motion.      Cervical back: Neck supple.   Lymphadenopathy:      Cervical: No cervical adenopathy.   Skin:     General: Skin is warm and dry.      Coloration: Skin is not pale.      Findings: No erythema or rash.   Neurological:      Mental Status: She is alert and oriented to person, place, and time.      Cranial Nerves: No cranial nerve deficit.      Sensory: No sensory deficit.   Psychiatric:         Speech: Speech normal.         Behavior: Behavior normal.         Thought Content: Thought content normal.         Judgment: Judgment normal.         Assessment/Plan   Diagnoses and all orders for this visit:    1. Health care maintenance " (Primary)  -     CBC (No Diff)  -     Comprehensive Metabolic Panel  -     Lipid Panel  -     Vitamin B12  -     Vitamin D 25 Hydroxy  -     TSH Rfx On Abnormal To Free T4    2. Routine screening for STI (sexually transmitted infection)  -     HIV-1/O/2 Ag/Ab w Reflex  -     Hepatitis Panel, Acute        1. Health Care Maintenance  -pap UTD with gyn  -mamm UTD  -cscope UTD, will get record loaded into epic  -fasting labs today  -Patient's Body mass index is 29.7 kg/m². indicating that she is overweight (BMI 25-29.9). Obesity-related health conditions include the following: none. Obesity is improving with lifestyle modifications. BMI is is above average; BMI management plan is completed. We discussed low calorie, low carb based diet program, portion control, increasing exercise and Weight Watchers or other Commercial based weight reduction program..    Reviewed the following with the patient: advised patient of need for:  pap smear, mammogram, colonoscopy, immunizations discussed, influenza vaccine and Adacel-Tdap vaccine, encouraged patient to exercise 5-7 days per week for 30 minutes at a time and weight loss encouraged.  Counseled regarding: age-appropriate screening labs and tests, wearing seatbelt and sunscreen regularly  Discussed: regular exercise and diet changes to promote healthy weight, checking skin regularly for abnormal moles and lesions           Please note that portions of this note were completed with a voice recognition program. Efforts were made to edit the dictations, but occasionally words are mistranscribed.

## 2021-09-30 LAB — TSH SERPL DL<=0.05 MIU/L-ACNC: 1.32 UIU/ML (ref 0.27–4.2)

## 2021-12-15 ENCOUNTER — TELEPHONE (OUTPATIENT)
Dept: INTERNAL MEDICINE | Facility: CLINIC | Age: 51
End: 2021-12-15

## 2021-12-15 NOTE — TELEPHONE ENCOUNTER
Caller: Marina Diaz    Relationship: Self    Best call back number: [phone]     What was the call regarding: PATIENT CALLED STATING THAT SHE HAS BEEN TAKING VITAMIN B12 FOR 30 DAYS.  PATIENT ASKED IF SHE NEEDED TO HAVE LABS DONE AGAIN OR IF SHE NEEDED TO BE SEEN AGAIN.    PATIENT ALSO ASKED ABOUT A REFERRAL FOR A DERMATOLOGIST.       Do you require a callback: YES

## 2021-12-16 DIAGNOSIS — E53.8 B12 DEFICIENCY: ICD-10-CM

## 2021-12-16 DIAGNOSIS — Z12.83 SKIN CANCER SCREENING: Primary | ICD-10-CM

## 2021-12-22 ENCOUNTER — LAB (OUTPATIENT)
Dept: LAB | Facility: HOSPITAL | Age: 51
End: 2021-12-22

## 2021-12-22 PROCEDURE — 82607 VITAMIN B-12: CPT | Performed by: INTERNAL MEDICINE

## 2021-12-23 LAB — VIT B12 BLD-MCNC: 666 PG/ML (ref 211–946)

## 2022-04-25 ENCOUNTER — TELEPHONE (OUTPATIENT)
Dept: INTERNAL MEDICINE | Facility: CLINIC | Age: 52
End: 2022-04-25

## 2022-04-25 NOTE — TELEPHONE ENCOUNTER
Caller: Marina Diaz    Relationship to patient: Self    Best call back number: [phone]    Date of exposure: 04/17/22    Date of positive COVID19 test: 04/22/22    COVID19 symptoms: HEAD AND CHEST CONGESTION, SORE THROAT    Date of initial quarantine: 04/22/22    Additional information or concerns: PATENT TESTED POSITIVE AT HER JOB ON Friday    PATIENT WANTS TO KNOW WHAT SHE NEED TO DO NOW      What is the patients preferred pharmacy: [facility] [address] [phone] [facility] [phone]

## 2022-06-27 ENCOUNTER — OFFICE VISIT (OUTPATIENT)
Dept: INTERNAL MEDICINE | Facility: CLINIC | Age: 52
End: 2022-06-27

## 2022-06-27 VITALS
SYSTOLIC BLOOD PRESSURE: 128 MMHG | BODY MASS INDEX: 32.08 KG/M2 | DIASTOLIC BLOOD PRESSURE: 82 MMHG | HEIGHT: 66 IN | WEIGHT: 199.6 LBS | TEMPERATURE: 97.1 F

## 2022-06-27 DIAGNOSIS — H00.12 CHALAZION OF RIGHT LOWER EYELID: Primary | ICD-10-CM

## 2022-06-27 PROCEDURE — 99213 OFFICE O/P EST LOW 20 MIN: CPT | Performed by: INTERNAL MEDICINE

## 2022-06-27 RX ORDER — ERYTHROMYCIN 5 MG/G
OINTMENT OPHTHALMIC NIGHTLY
Qty: 1 G | Refills: 0 | Status: SHIPPED | OUTPATIENT
Start: 2022-06-27 | End: 2022-11-28

## 2022-06-27 NOTE — PROGRESS NOTES
"Subjective   Marina Diaz is a 52 y.o. female here for right lower eyelid swelling and pain.  It started yesterday when she woke up from a nap around 5 or 6 PM.  This morning, it was really swollen and red underneath the eye and it was painful.  No vision change or and no discharge from the eye.  No trauma or foreign body to the eye.  No foreign body sensation currently.    I have reviewed the following portions of the patient's history and confirmed they are accurate: current medications, past medical history, past social history and problem list     I have personally reviewed and performed the ROS. Shu Stokes MD     Review of Systems:  General: negative  HEENT: See HPI    Objective   /82   Temp 97.1 °F (36.2 °C) (Temporal)   Ht 167.6 cm (66\")   Wt 90.5 kg (199 lb 9.6 oz)   BMI 32.22 kg/m²     Physical Exam  Vitals reviewed.   Constitutional:       Appearance: She is well-developed.   Eyes:      General:         Right eye: No foreign body or discharge.        Comments: Right lower conjunctival erythema on the eyelid, small chalazion inside of the lid over indicated area surrounding erythema.  No ulceration, no pus.   Pulmonary:      Effort: Pulmonary effort is normal.   Skin:     General: Skin is warm and dry.   Neurological:      Mental Status: She is alert and oriented to person, place, and time.   Psychiatric:         Behavior: Behavior normal.         Thought Content: Thought content normal.         Judgment: Judgment normal.         Assessment & Plan   Diagnoses and all orders for this visit:    1. Chalazion of right lower eyelid (Primary)  -     erythromycin (ROMYCIN) 5 MG/GM ophthalmic ointment; Administer  to the right eye Every Night.  Dispense: 1 g; Refill: 0  -ice compresses             Shu Stokes MD  "

## 2022-09-30 ENCOUNTER — LAB (OUTPATIENT)
Dept: LAB | Facility: HOSPITAL | Age: 52
End: 2022-09-30

## 2022-09-30 ENCOUNTER — OFFICE VISIT (OUTPATIENT)
Dept: INTERNAL MEDICINE | Facility: CLINIC | Age: 52
End: 2022-09-30

## 2022-09-30 VITALS
DIASTOLIC BLOOD PRESSURE: 68 MMHG | HEART RATE: 74 BPM | TEMPERATURE: 97.4 F | HEIGHT: 66 IN | SYSTOLIC BLOOD PRESSURE: 122 MMHG | WEIGHT: 205 LBS | BODY MASS INDEX: 32.95 KG/M2 | OXYGEN SATURATION: 95 %

## 2022-09-30 DIAGNOSIS — E53.8 B12 DEFICIENCY: ICD-10-CM

## 2022-09-30 DIAGNOSIS — R79.89 LOW VITAMIN D LEVEL: ICD-10-CM

## 2022-09-30 DIAGNOSIS — E66.09 CLASS 1 OBESITY DUE TO EXCESS CALORIES WITHOUT SERIOUS COMORBIDITY WITH BODY MASS INDEX (BMI) OF 33.0 TO 33.9 IN ADULT: ICD-10-CM

## 2022-09-30 DIAGNOSIS — Z00.00 HEALTH CARE MAINTENANCE: Primary | ICD-10-CM

## 2022-09-30 LAB
25(OH)D3 SERPL-MCNC: 35.8 NG/ML (ref 30–100)
ALBUMIN SERPL-MCNC: 4.3 G/DL (ref 3.5–5.2)
ALBUMIN/GLOB SERPL: 1.5 G/DL
ALP SERPL-CCNC: 55 U/L (ref 39–117)
ALT SERPL W P-5'-P-CCNC: 10 U/L (ref 1–33)
ANION GAP SERPL CALCULATED.3IONS-SCNC: 10 MMOL/L (ref 5–15)
AST SERPL-CCNC: 18 U/L (ref 1–32)
BILIRUB SERPL-MCNC: 0.4 MG/DL (ref 0–1.2)
BUN SERPL-MCNC: 11 MG/DL (ref 6–20)
BUN/CREAT SERPL: 10.7 (ref 7–25)
CALCIUM SPEC-SCNC: 9.6 MG/DL (ref 8.6–10.5)
CHLORIDE SERPL-SCNC: 102 MMOL/L (ref 98–107)
CHOLEST SERPL-MCNC: 273 MG/DL (ref 0–200)
CO2 SERPL-SCNC: 25 MMOL/L (ref 22–29)
CREAT SERPL-MCNC: 1.03 MG/DL (ref 0.57–1)
DEPRECATED RDW RBC AUTO: 38.3 FL (ref 37–54)
EGFRCR SERPLBLD CKD-EPI 2021: 66 ML/MIN/1.73
ERYTHROCYTE [DISTWIDTH] IN BLOOD BY AUTOMATED COUNT: 12.7 % (ref 12.3–15.4)
GLOBULIN UR ELPH-MCNC: 2.9 GM/DL
GLUCOSE SERPL-MCNC: 88 MG/DL (ref 65–99)
HCT VFR BLD AUTO: 37.1 % (ref 34–46.6)
HDLC SERPL-MCNC: 57 MG/DL (ref 40–60)
HGB BLD-MCNC: 12.6 G/DL (ref 12–15.9)
LDLC SERPL CALC-MCNC: 184 MG/DL (ref 0–100)
LDLC/HDLC SERPL: 3.18 {RATIO}
MCH RBC QN AUTO: 28.2 PG (ref 26.6–33)
MCHC RBC AUTO-ENTMCNC: 34 G/DL (ref 31.5–35.7)
MCV RBC AUTO: 83 FL (ref 79–97)
PLATELET # BLD AUTO: 250 10*3/MM3 (ref 140–450)
PMV BLD AUTO: 11.3 FL (ref 6–12)
POTASSIUM SERPL-SCNC: 4.6 MMOL/L (ref 3.5–5.2)
PROT SERPL-MCNC: 7.2 G/DL (ref 6–8.5)
RBC # BLD AUTO: 4.47 10*6/MM3 (ref 3.77–5.28)
SODIUM SERPL-SCNC: 137 MMOL/L (ref 136–145)
TRIGL SERPL-MCNC: 174 MG/DL (ref 0–150)
VIT B12 BLD-MCNC: 410 PG/ML (ref 211–946)
VLDLC SERPL-MCNC: 32 MG/DL (ref 5–40)
WBC NRBC COR # BLD: 6.55 10*3/MM3 (ref 3.4–10.8)

## 2022-09-30 PROCEDURE — 80061 LIPID PANEL: CPT | Performed by: INTERNAL MEDICINE

## 2022-09-30 PROCEDURE — 80053 COMPREHEN METABOLIC PANEL: CPT | Performed by: INTERNAL MEDICINE

## 2022-09-30 PROCEDURE — 82306 VITAMIN D 25 HYDROXY: CPT | Performed by: INTERNAL MEDICINE

## 2022-09-30 PROCEDURE — 85027 COMPLETE CBC AUTOMATED: CPT | Performed by: INTERNAL MEDICINE

## 2022-09-30 PROCEDURE — 82607 VITAMIN B-12: CPT | Performed by: INTERNAL MEDICINE

## 2022-09-30 PROCEDURE — 99396 PREV VISIT EST AGE 40-64: CPT | Performed by: INTERNAL MEDICINE

## 2022-09-30 NOTE — PROGRESS NOTES
"Subjective   Marina Diaz is a 52 y.o. female here for yearly physical.    Review of Systems   Constitutional: Negative.    HENT: Negative.    Eyes: Negative.    Respiratory: Negative.    Cardiovascular: Negative.    Gastrointestinal: Negative.    Endocrine: Negative.    Genitourinary: Negative.    Musculoskeletal: Negative.    Skin: Negative.    Allergic/Immunologic: Negative.    Neurological: Negative.    Hematological: Negative.    Psychiatric/Behavioral: Negative.           Past Medical History:   Diagnosis Date   • Arthritis    • Fibroids    • Wears glasses      Family History   Problem Relation Age of Onset   • Hypertension Mother    • Obesity Mother    • Cancer Other    • Diabetes Other      Past Surgical History:   Procedure Laterality Date   • BREAST LUMPECTOMY Bilateral 2007   • COLONOSCOPY  2018   • FOOT SURGERY     • TOTAL ABDOMINAL HYSTERECTOMY N/A 5/29/2019    Procedure: TOTAL ABDOMINAL HYSTERECTOMY WITH BILATERAL SALPINGECTOMY;  Surgeon: Marjorie Deng MD;  Location: [facility];  Service: Obstetrics/Gynecology     Social History     Socioeconomic History   • Marital status: Single   Tobacco Use   • Smoking status: Never Smoker   • Smokeless tobacco: Never Used   Substance and Sexual Activity   • Alcohol use: Yes     Comment: Rare   • Drug use: No   • Sexual activity: Defer         Current Outpatient Medications:   •  Cholecalciferol (VITAMIN D3) 2000 UNITS tablet, Take 2,000 Units by mouth Daily., Disp: , Rfl:   •  erythromycin (ROMYCIN) 5 MG/GM ophthalmic ointment, Administer  to the right eye Every Night., Disp: 1 g, Rfl: 0  •  Multiple Vitamins-Minerals (MULTIVITAMIN ADULT PO), Take 1 tablet by mouth Daily., Disp: , Rfl:   •  Probiotic Product (PROBIOTIC DAILY PO), Probiotic, Disp: , Rfl:     Objective   /68   Pulse 74   Temp 97.4 °F (36.3 °C) (Temporal)   Ht 167.6 cm (66\")   Wt 93 kg (205 lb)   SpO2 95%   BMI 33.09 kg/m²   Physical Exam  Vitals reviewed. "   Constitutional:       General: She is not in acute distress.     Appearance: She is well-developed.   HENT:      Head: Normocephalic and atraumatic.      Right Ear: Tympanic membrane, ear canal and external ear normal.      Left Ear: Tympanic membrane, ear canal and external ear normal.      Nose: Nose normal.      Mouth/Throat:      Lips: Pink.      Mouth: Mucous membranes are moist.      Tongue: No lesions.      Pharynx: Oropharynx is clear.   Eyes:      General: Lids are normal. Vision grossly intact. No scleral icterus.     Extraocular Movements: Extraocular movements intact.      Conjunctiva/sclera: Conjunctivae normal.      Pupils: Pupils are equal, round, and reactive to light.   Neck:      Thyroid: No thyroid mass or thyromegaly.      Vascular: No carotid bruit.   Cardiovascular:      Rate and Rhythm: Normal rate and regular rhythm.      Pulses:           Dorsalis pedis pulses are 2+ on the right side and 2+ on the left side.        Posterior tibial pulses are 2+ on the right side and 2+ on the left side.      Heart sounds: Normal heart sounds. No murmur heard.    No friction rub. No gallop. No S3 or S4 sounds.   Pulmonary:      Effort: Pulmonary effort is normal. No respiratory distress.      Breath sounds: Normal breath sounds. No wheezing, rhonchi or rales.   Abdominal:      General: Bowel sounds are normal. There is no distension.      Palpations: Abdomen is soft. There is no mass.      Tenderness: There is no abdominal tenderness.   Musculoskeletal:         General: Normal range of motion.      Cervical back: Neck supple.      Right lower leg: No edema.      Left lower leg: No edema.   Lymphadenopathy:      Cervical: No cervical adenopathy.   Skin:     General: Skin is warm and dry.      Coloration: Skin is not pale.      Findings: No erythema or rash.   Neurological:      Mental Status: She is alert and oriented to person, place, and time.      Cranial Nerves: No cranial nerve deficit.      Sensory:  No sensory deficit.      Gait: Gait is intact.   Psychiatric:         Attention and Perception: Attention normal.         Mood and Affect: Mood normal.         Speech: Speech normal.         Behavior: Behavior normal.         Thought Content: Thought content normal.         Judgment: Judgment normal.         Assessment & Plan   Diagnoses and all orders for this visit:    1. Health care maintenance (Primary)  -     CBC (No Diff)  -     Comprehensive Metabolic Panel  -     Lipid Panel  -     Vitamin D 25 Hydroxy    2. B12 deficiency  -     Vitamin B12    3. Low vitamin D level  -     Vitamin D 25 Hydroxy    4. Class 1 obesity due to excess calories without serious comorbidity with body mass index (BMI) of 33.0 to 33.9 in adult  -     Ambulatory Referral to Weight Management Program  Body mass index is 33.09 kg/m².   -gave pt several meds to look up including wellbutrin, contrave, semaglutide, phentermine  -rec pt continue her diet          1. Health Care Maintenance  -pap UTD  -mamm UTD  -cscope UTD  -fasting labs reviewed  -BMI is >= 30 and <35. (Class 1 Obesity). The following options were offered after discussion;: weight loss educational material (shared in after visit summary) and referral to a weight management program    Reviewed the following with the patient: advised patient of need for:  pap smear, mammogram, colonoscopy and immunizations discussed, encouraged patient to exercise 5-7 days per week for 30 minutes at a time, weight loss encouraged and Weight Watchers encouraged.  Counseled regarding: age-appropriate screening labs and tests, wearing seatbelt and sunscreen regularly  Discussed: regular exercise and diet changes to promote healthy weight, checking skin regularly for abnormal moles and lesions

## 2022-11-28 ENCOUNTER — OFFICE VISIT (OUTPATIENT)
Dept: INTERNAL MEDICINE | Facility: CLINIC | Age: 52
End: 2022-11-28

## 2022-11-28 VITALS
SYSTOLIC BLOOD PRESSURE: 116 MMHG | HEIGHT: 66 IN | WEIGHT: 212 LBS | OXYGEN SATURATION: 100 % | HEART RATE: 65 BPM | BODY MASS INDEX: 34.07 KG/M2 | TEMPERATURE: 98.5 F | DIASTOLIC BLOOD PRESSURE: 70 MMHG

## 2022-11-28 DIAGNOSIS — R19.09 UMBILICAL SWELLING: Primary | ICD-10-CM

## 2022-11-28 PROCEDURE — 99213 OFFICE O/P EST LOW 20 MIN: CPT | Performed by: INTERNAL MEDICINE

## 2022-11-30 NOTE — PROGRESS NOTES
"Subjective   Marina Diaz is a 52 y.o. female here for umbilical mass/swelling.  She noticed it recently.  It has not been particularly painful frequently.  She did have a laparoscopic hysterectomy within the last few years and they did go through the umbilicus.  No overlying skin change that patient has noticed.  Does not seem to fluctuate with bowel movements.  Bowel movements have been regular.    I have reviewed the following portions of the patient's history and confirmed they are accurate: current medications, past medical history, past social history, past surgical history and problem list     I have personally reviewed and performed the ROS. Shu Stokes MD     Review of Systems:  General: negative  GI: See HPI    Objective   /70   Pulse 65   Temp 98.5 °F (36.9 °C)   Ht 167.6 cm (66\")   Wt 96.2 kg (212 lb)   SpO2 100%   BMI 34.22 kg/m²     Physical Exam  Vitals reviewed.   Constitutional:       Appearance: She is well-developed.   Pulmonary:      Effort: Pulmonary effort is normal.   Abdominal:      General: Abdomen is flat.       Skin:     General: Skin is warm and dry.   Neurological:      Mental Status: She is alert and oriented to person, place, and time.   Psychiatric:         Behavior: Behavior normal.         Thought Content: Thought content normal.         Judgment: Judgment normal.         Assessment & Plan   Diagnoses and all orders for this visit:    1. Umbilical swelling (Primary)  -     US abdomen limited; Future  -Suspect small fat-containing umbilical hernia, will get ultrasound to confirm.  Discussed the nature of hernias and that I can go ahead and refer her to general surgery if she would like or watch and wait.  Discussed concerning features of hernia including pain that will not go away and that that would indicate an emergency             Shu Stokes MD  Answers for HPI/ROS submitted by the patient on 11/28/2022  What is the primary reason " for your visit?: Abdominal Pain  Onset: 1 to 4 weeks ago  anorexia: No  arthralgias: No  belching: No  constipation: No  diarrhea: No  dysuria: No  fever: No  flatus: No  frequency: No  headaches: No  hematochezia: No  hematuria: No  melena: No  myalgias: No  nausea: Yes  weight loss: No  vomiting: No

## 2022-12-06 ENCOUNTER — HOSPITAL ENCOUNTER (OUTPATIENT)
Dept: ULTRASOUND IMAGING | Facility: HOSPITAL | Age: 52
Discharge: HOME | End: 2022-12-06
Admitting: INTERNAL MEDICINE

## 2022-12-06 DIAGNOSIS — R19.09 UMBILICAL SWELLING: ICD-10-CM

## 2022-12-06 PROCEDURE — 76705 ECHO EXAM OF ABDOMEN: CPT

## 2022-12-07 ENCOUNTER — TELEPHONE (OUTPATIENT)
Dept: INTERNAL MEDICINE | Facility: CLINIC | Age: 52
End: 2022-12-07

## 2022-12-07 NOTE — TELEPHONE ENCOUNTER
Pt Notified of message below. She will watch this for now and if it becomes bothersome she will  Request a referral to a surgeon.         LMOM for pt to call office    ----- Message from Shu Stokes MD sent at 12/7/2022  7:50 AM EST -----  Please call the patient tell her the ultrasound did show a small fat containing hernia.  Is she interested in repair or would she like to just watch this for now?  If I send her to a surgeon, they would offer repair most likely.

## 2022-12-08 ENCOUNTER — TELEPHONE (OUTPATIENT)
Dept: INTERNAL MEDICINE | Facility: CLINIC | Age: 52
End: 2022-12-08

## 2022-12-08 NOTE — TELEPHONE ENCOUNTER
PT called stating that she was returning a phone call from Vijaya. PT would like a call back on her cell phone at [phone]

## 2022-12-08 NOTE — TELEPHONE ENCOUNTER
----- Message from Shu Stokes MD sent at 12/7/2022  7:50 AM EST -----  Please call the patient tell her the ultrasound did show a small fat containing hernia.  Is she interested in repair or would she like to just watch this for now?  If I send her to a surgeon, they would offer repair most likely.

## 2023-02-20 NOTE — PROGRESS NOTES
[facility]  [address]  [place] [address]   Office Note      Date: 2023  Patient Name: Marina Diaz  MRN: [id number]  : 1970  Subjective  Subjective     Chief Complaint  Obesity Management           Marina Diaz presents to [facility] for obesity management. She has a past medical history of hyperlipidemia and osteoarthritis. She has a family history of type 2 diabetes. She is currenlty at her highest weight. She was previously able to make life style changes and reduce weight, but her most recent efforts have been unsuccessful and frustrating. She has never previously use weight loss medication. She has had some success on weight watcher but currently not participating in this program.     Highest lifetime weight: 205 pounds. Today's weight is 97.6 kg (215 lb 3.2 oz) pounds.   Weight 5 years ago: 170  The patient is exercising with a FITT score of:    Frequency   Intensity Time Strength Training   [x]   0 None  [x]   0 None  [x]   0 None  [x]   0 None    []   1 (1-2x/week) []   1 (light) []   1 (<10 min) []   1 (1x/week)   []   2 (3-5x/week) []   2 (moderate) []   2 (10-20 min) []   2 (2x/week)   []   3 (daily)   []   3 (moderately hard)  []   4 (very hard) []   3 (20-30 min)  []   4 (>30 min) []   3 (3-4x/week)         The following seem to sabotage weight loss efforts:comfort/stress eating, mindless eating (snacking while working or watching TV), boredom eating and specific cravings like carbohydrates    Review of Systems   Constitutional: Negative for fatigue.        Positive for weight gain   HENT: Negative for trouble swallowing.         Negative for throat swelling   Respiratory: Negative for shortness of breath and wheezing.         Negative for snoring   Cardiovascular: Negative for chest pain, palpitations and leg swelling.   Gastrointestinal: Negative for abdominal pain, constipation,  "diarrhea, GERD and indigestion.   Endocrine: Negative for cold intolerance, heat intolerance, polydipsia, polyphagia and polyuria.        Negative for loss of hair  Negative for hirsutism     Genitourinary:        Denies menstrual irregularities   Musculoskeletal: Negative for arthralgias.        Denies exercise limitations  Denies chronic pain   Skin: Negative for dry skin.        Negative for acne   Neurological: Negative for headache and memory problem.        Negative for paresthesias   Psychiatric/Behavioral: Negative for self-injury, sleep disturbance, suicidal ideas and depressed mood. The patient is not nervous/anxious.        PHQ-9 Total Score: 0       Objective   Body mass index is 37.52 kg/m².  Body composition analysis completed and showed:   %body fat: 47.3    Measurements (in inches)  Neck: 13  Chest: 44  Waist: 40.5  Hips: 45  Thighs: 39    Vital Signs:   /78   Pulse 70   Ht 161.3 cm (63.5\")   Wt 97.6 kg (215 lb 3.2 oz)   SpO2 97%   BMI 37.52 kg/m²       Physical Exam  Constitutional:       Appearance: Normal appearance. She is obese.   Cardiovascular:      Rate and Rhythm: Normal rate and regular rhythm.      Heart sounds: Normal heart sounds.   Pulmonary:      Effort: Pulmonary effort is normal. No respiratory distress.      Breath sounds: Normal breath sounds.   Skin:     General: Skin is warm and dry.   Neurological:      Mental Status: She is alert and oriented to person, place, and time.   Psychiatric:         Attention and Perception: Attention and perception normal.         Mood and Affect: Mood normal.         Speech: Speech normal.         Behavior: Behavior normal.        Result Review :     CMP    CMP 9/30/22   Glucose 88   BUN 11   Creatinine 1.03 (A)   eGFR 66.0   Sodium 137   Potassium 4.6   Chloride 102   Calcium 9.6   Total Protein 7.2   Albumin 4.30   Globulin 2.9   Total Bilirubin 0.4   Alkaline Phosphatase 55   AST (SGOT) 18   ALT (SGPT) 10   Albumin/Globulin Ratio 1.5 "   BUN/Creatinine Ratio 10.7   Anion Gap 10.0   (A) Abnormal value       Comments are available for some flowsheets but are not being displayed.           CBC    CBC 9/30/22   WBC 6.55   RBC 4.47   Hemoglobin 12.6   Hematocrit 37.1   MCV 83.0   MCH 28.2   MCHC 34.0   RDW 12.7   Platelets 250           Lipid Panel    Lipid Panel 9/30/22   Total Cholesterol 273 (A)   Triglycerides 174 (A)   HDL Cholesterol 57   VLDL Cholesterol 32   LDL Cholesterol  184 (A)   LDL/HDL Ratio 3.18   (A) Abnormal value                Assessment / Plan       Diagnoses and all orders for this visit:    1. Obesity (BMI 30-39.9) (Primary)  Assessment & Plan:  Patient's (Body mass index is 37.52 kg/m².) indicates that they are obese (BMI >30) with health conditions that include dyslipidemias and osteoarthritis . Weight is newly identified. BMI  is above average; BMI management plan is completed. We discussed low calorie, low carb based diet program, portion control, increasing exercise and an mildred-based approach such as [employer]x Pal or Lose It.   --This is a initial visit and patient was referred over by her primary care provider.  -- Fasting labs completed in office today looking in particular at potential prediabetes or insulin resistance as she does have a family history of this.  Also looking again at lipid levels as they were elevated in the past.  -- Body composition analysis was reviewed in office today and weight goals and calorie goals set up based on this information.  [employer] food journal set up in office today and patient advised that #1 goal is to start journaling  --Considering adding psyllium husk next visit  --Patient is interested in medication.  She has never previously been on medical weight management medication.  Eligibility form provided and asked her to return to 2-week follow-up.    Orders:  -     CBC & Differential  -     Comprehensive Metabolic Panel  -     Hemoglobin A1c  -     Insulin, Total  -     Lipid Panel  -      T3, Free  -     TSH  -     Urine Drug Screen - Urine, Clean Catch  -     Vitamin D,25-Hydroxy  -     multivitamin with minerals (Multivitamin Adults) tablet tablet; Take 1 tablet by mouth Daily.  Dispense: 30 tablet; Refill: 2    2. Mixed hyperlipidemia  -     Lipid Panel       Topics of discussion included obesity as a disease, nutritional education on food groups, exercise, and medications. Patient was instructed in adequate protein, controlled carb and controlled fat intake. Patient received instructions on using the medicines as a tool in controlling their weight with nutritional and behavioral changes. Risks and benefits were discussed. I believe the potential benefits of medication helping to decrease weight outweighs the risks. Patient is to try nutritonal/behavioral changes only first. Patient received our clinic education booklet.   Our patient consent form was reviewed including potential risks of weight loss. We also reviewed our confidentiality and HIPPA statements. Patients current FITT score was reviewed along with current capability for exercise tolerance and a patient will work towards a FITT score of: Patient's past medical history was reviewed in detail and barriers to weight loss were identified and discussed. Past efforts at weight reduction on their own as well as under medical provider supervision were documented and discussed.  I advised patient to continue routine care with their Primary Care Provider. Nutritional recommendations and goals were reviewed based on body composition analysis including basal metabolic rate. Goal set for Calories,  adjusted for exercise calories burnt as well as Macronutrient. Take other medications and supplements as directed. Increase physical activity as tolerated without side effects.       I spent 75 minutes on this date of service. This time includes time spent by me in the following activities:preparing for the visit, counseling and educating the  patient/family/caregiver, ordering medications, tests, or procedures and documenting information in the medical record.    Follow Up   Return in about 2 weeks (around 3/7/2023).  Patient was given instructions and counseling regarding her condition or for health maintenance advice. Please see specific information pulled into the AVS if appropriate.     Shelbi Pereira, APRN  02/21/2023

## 2023-02-21 ENCOUNTER — OFFICE VISIT (OUTPATIENT)
Dept: BARIATRICS/WEIGHT MGMT | Facility: CLINIC | Age: 53
End: 2023-02-21
Payer: COMMERCIAL

## 2023-02-21 VITALS
DIASTOLIC BLOOD PRESSURE: 78 MMHG | SYSTOLIC BLOOD PRESSURE: 140 MMHG | OXYGEN SATURATION: 97 % | HEART RATE: 70 BPM | WEIGHT: 215.2 LBS | HEIGHT: 64 IN | BODY MASS INDEX: 36.74 KG/M2

## 2023-02-21 DIAGNOSIS — E78.2 MIXED HYPERLIPIDEMIA: ICD-10-CM

## 2023-02-21 DIAGNOSIS — E66.9 OBESITY (BMI 30-39.9): Primary | ICD-10-CM

## 2023-02-21 PROBLEM — M19.90 OSTEOARTHRITIS: Status: ACTIVE | Noted: 2023-02-21

## 2023-02-21 PROBLEM — E78.5 HYPERLIPEMIA: Status: ACTIVE | Noted: 2023-02-21

## 2023-02-21 PROCEDURE — 99205 OFFICE O/P NEW HI 60 MIN: CPT | Performed by: NURSE PRACTITIONER

## 2023-02-21 RX ORDER — MULTIPLE VITAMINS W/ MINERALS TAB 9MG-400MCG
1 TAB ORAL DAILY
Qty: 30 TABLET | Refills: 2
Start: 2023-02-21

## 2023-02-21 NOTE — ASSESSMENT & PLAN NOTE
Patient's (Body mass index is 37.52 kg/m².) indicates that they are obese (BMI >30) with health conditions that include dyslipidemias and osteoarthritis . Weight is newly identified. BMI  is above average; BMI management plan is completed. We discussed low calorie, low carb based diet program, portion control, increasing exercise and an mildred-based approach such as [employer] Pal or Lose It.   --This is a initial visit and patient was referred over by her primary care provider.  -- Fasting labs completed in office today looking in particular at potential prediabetes or insulin resistance as she does have a family history of this.  Also looking again at lipid levels as they were elevated in the past.  -- Body composition analysis was reviewed in office today and weight goals and calorie goals set up based on this information.  Baritastic food journal set up in office today and patient advised that #1 goal is to start journaling  --Considering adding psyllium husk next visit  --Patient is interested in medication.  She has never previously been on medical weight management medication.  Eligibility form provided and asked her to return to 2-week follow-up.

## 2023-02-22 LAB
25(OH)D3+25(OH)D2 SERPL-MCNC: 28.7 NG/ML (ref 30–100)
ALBUMIN SERPL-MCNC: 3.9 G/DL (ref 3.8–4.9)
ALBUMIN/GLOB SERPL: 1.5 {RATIO} (ref 1.2–2.2)
ALP SERPL-CCNC: 54 IU/L (ref 44–121)
ALT SERPL-CCNC: 11 IU/L (ref 0–32)
AMPHETAMINES UR QL SCN: NEGATIVE NG/ML
AST SERPL-CCNC: 14 IU/L (ref 0–40)
BARBITURATES UR QL SCN: NEGATIVE NG/ML
BASOPHILS # BLD AUTO: 0 X10E3/UL (ref 0–0.2)
BASOPHILS NFR BLD AUTO: 1 %
BENZODIAZ UR QL SCN: NEGATIVE NG/ML
BILIRUB SERPL-MCNC: 0.3 MG/DL (ref 0–1.2)
BUN SERPL-MCNC: 9 MG/DL (ref 6–24)
BUN/CREAT SERPL: 12 (ref 9–23)
BZE UR QL SCN: NEGATIVE NG/ML
CALCIUM SERPL-MCNC: 8.8 MG/DL (ref 8.7–10.2)
CANNABINOIDS UR QL SCN: NEGATIVE NG/ML
CHLORIDE SERPL-SCNC: 103 MMOL/L (ref 96–106)
CHOLEST SERPL-MCNC: 213 MG/DL (ref 100–199)
CO2 SERPL-SCNC: 23 MMOL/L (ref 20–29)
CREAT SERPL-MCNC: 0.74 MG/DL (ref 0.57–1)
CREAT UR-MCNC: 21.6 MG/DL (ref 20–300)
EGFRCR SERPLBLD CKD-EPI 2021: 98 ML/MIN/1.73
EOSINOPHIL # BLD AUTO: 0 X10E3/UL (ref 0–0.4)
EOSINOPHIL NFR BLD AUTO: 1 %
ERYTHROCYTE [DISTWIDTH] IN BLOOD BY AUTOMATED COUNT: 13 % (ref 11.7–15.4)
GLOBULIN SER CALC-MCNC: 2.6 G/DL (ref 1.5–4.5)
GLUCOSE SERPL-MCNC: 87 MG/DL (ref 70–99)
HBA1C MFR BLD: 5.7 % (ref 4.8–5.6)
HCT VFR BLD AUTO: 35.2 % (ref 34–46.6)
HDLC SERPL-MCNC: 53 MG/DL
HGB BLD-MCNC: 12 G/DL (ref 11.1–15.9)
IMM GRANULOCYTES # BLD AUTO: 0 X10E3/UL (ref 0–0.1)
IMM GRANULOCYTES NFR BLD AUTO: 1 %
INSULIN SERPL-ACNC: 7.6 UIU/ML (ref 2.6–24.9)
LABORATORY COMMENT REPORT: NORMAL
LDLC SERPL CALC-MCNC: 139 MG/DL (ref 0–99)
LYMPHOCYTES # BLD AUTO: 2.3 X10E3/UL (ref 0.7–3.1)
LYMPHOCYTES NFR BLD AUTO: 35 %
MCH RBC QN AUTO: 28.6 PG (ref 26.6–33)
MCHC RBC AUTO-ENTMCNC: 34.1 G/DL (ref 31.5–35.7)
MCV RBC AUTO: 84 FL (ref 79–97)
METHADONE UR QL SCN: NEGATIVE NG/ML
MONOCYTES # BLD AUTO: 0.4 X10E3/UL (ref 0.1–0.9)
MONOCYTES NFR BLD AUTO: 6 %
NEUTROPHILS # BLD AUTO: 3.8 X10E3/UL (ref 1.4–7)
NEUTROPHILS NFR BLD AUTO: 56 %
OPIATES UR QL SCN: NEGATIVE NG/ML
OXYCODONE+OXYMORPHONE UR QL SCN: NEGATIVE NG/ML
PCP UR QL: NEGATIVE NG/ML
PH UR: 6.2 [PH] (ref 4.5–8.9)
PLATELET # BLD AUTO: 258 X10E3/UL (ref 150–450)
POTASSIUM SERPL-SCNC: 4.3 MMOL/L (ref 3.5–5.2)
PROPOXYPH UR QL SCN: NEGATIVE NG/ML
PROT SERPL-MCNC: 6.5 G/DL (ref 6–8.5)
RBC # BLD AUTO: 4.2 X10E6/UL (ref 3.77–5.28)
SODIUM SERPL-SCNC: 139 MMOL/L (ref 134–144)
T3FREE SERPL-MCNC: 3.2 PG/ML (ref 2–4.4)
TRIGL SERPL-MCNC: 120 MG/DL (ref 0–149)
TSH SERPL DL<=0.005 MIU/L-ACNC: 1.44 UIU/ML (ref 0.45–4.5)
VLDLC SERPL CALC-MCNC: 21 MG/DL (ref 5–40)
WBC # BLD AUTO: 6.5 X10E3/UL (ref 3.4–10.8)

## 2023-02-23 PROBLEM — R73.09 ELEVATED HEMOGLOBIN A1C: Status: ACTIVE | Noted: 2023-02-23

## 2023-02-27 PROBLEM — E55.9 VITAMIN D INSUFFICIENCY: Status: ACTIVE | Noted: 2023-02-27

## 2023-02-27 RX ORDER — ERGOCALCIFEROL 1.25 MG/1
50000 CAPSULE ORAL WEEKLY
Qty: 8 CAPSULE | Refills: 0 | Status: SHIPPED | OUTPATIENT
Start: 2023-02-27

## 2023-03-29 NOTE — PROGRESS NOTES
[facility]  [address]  [place] [address]     Office Note      Date: 2023  Patient Name: Marina Diaz  MRN: [id number]  : 1970   Subjective  Subjective     Chief Complaint  Obesity Management follow-up          Marina Diaz presents to [facility] for obesity management.   Patient is unsatisfied with weight loss progress. Appetite is moderately controlled. Reports no side effects of prescribed medications today. The patient is taking multivitamin and is not taking fish oil.  The patient is not using a food journal.  The patient rates current efforts as 5 out of 10.  The patient is exercising with a FITT score of:    Frequency Intensity Time Strength Training   [x]   0, none [x]   0 [x]   0 [x]   0   []   1 (1-2x/week) []   1 (light) []   1 (<10 min) []   1 (1x/week)   []   2 (3-5x/week) []   2 (moderate) []   2 (10-20 min) []   2 (2x/week)   []   3 (daily) []   3 (moderately hard)  []   4 (very hard) []   3 (20-30 min)  []   4 (>30 min) []   3 (3-4x/week)     Review of Systems   Constitutional: Negative for appetite change and fatigue.   Eyes: Negative for blurred vision, double vision and visual disturbance.   Cardiovascular: Negative for chest pain and palpitations.   Gastrointestinal: Negative for abdominal pain, constipation, diarrhea, nausea, vomiting and GERD.   Endocrine: Negative for polydipsia, polyphagia and polyuria.   Musculoskeletal: Negative for arthralgias, back pain and myalgias.   Neurological: Negative for dizziness, tremors, light-headedness, headache and memory problem.        Parasthesias negative   Psychiatric/Behavioral: Negative for sleep disturbance, depressed mood and stress. The patient is not nervous/anxious.        Objective   Start weight: 215 pounds.    Total Loss lb/%Loss of beginning body weight (BBW): +0.5lb/0.23%  Change in weight since last visit: +0.5    Recent  "Weight History:   Wt Readings from Last 6 Encounters:   03/30/23 97.9 kg (215 lb 12.8 oz)   02/21/23 97.6 kg (215 lb 3.2 oz)   11/28/22 96.2 kg (212 lb)   09/30/22 93 kg (205 lb)   06/27/22 90.5 kg (199 lb 9.6 oz)   09/29/21 83.5 kg (184 lb)       Body mass index is 36.47 kg/m².   Body composition analysis completed and showed:   %body fat: 43.2  Measurements (in inches)  Waist: 40    Vital Signs:   /78   Pulse 60   Ht 163.8 cm (64.5\")   Wt 97.9 kg (215 lb 12.8 oz)   SpO2 100%   BMI 36.47 kg/m²     Physical Exam  Vitals reviewed.   Constitutional:       Appearance: She is obese. She is not ill-appearing.   HENT:      Head:      Comments: masked  Pulmonary:      Effort: Pulmonary effort is normal.   Neurological:      Mental Status: She is alert.   Psychiatric:         Attention and Perception: Attention and perception normal.         Behavior: Behavior is cooperative.        Result Review :     CMP    CMP 9/30/22 2/21/23   Glucose 88 87   BUN 11 9   Creatinine 1.03 (A) 0.74   eGFR 66.0    Sodium 137 139   Potassium 4.6 4.3   Chloride 102 103   Calcium 9.6 8.8   Total Protein  6.5   Total Protein 7.2    Albumin 4.30 3.9   Globulin  2.6   Globulin 2.9    Total Bilirubin 0.4 0.3   Alkaline Phosphatase 55 54   AST (SGOT) 18 14   ALT (SGPT) 10 11   Albumin/Globulin Ratio 1.5    BUN/Creatinine Ratio 10.7 12   Anion Gap 10.0    (A) Abnormal value       Comments are available for some flowsheets but are not being displayed.           CBC w/diff    CBC w/Diff 9/30/22 2/21/23   WBC 6.55 6.5   RBC 4.47 4.20   Hemoglobin 12.6 12.0   Hematocrit 37.1 35.2   MCV 83.0 84   MCH 28.2 28.6   MCHC 34.0 34.1   RDW 12.7 13.0   Platelets 250 258   Neutrophil Rel %  56   Lymphocyte Rel %  35   Monocyte Rel %  6   Eosinophil Rel %  1   Basophil Rel %  1           Lipid Panel    Lipid Panel 9/30/22 2/21/23   Total Cholesterol 273 (A)    Total Cholesterol  213 (A)   Triglycerides 174 (A) 120   HDL Cholesterol 57 53   VLDL " Cholesterol 32 21   LDL Cholesterol  184 (A) 139 (A)   LDL/HDL Ratio 3.18    (A) Abnormal value            TSH    TSH 2/21/23   TSH 1.440           Most Recent A1C    HGBA1C Most Recent 2/21/23   Hemoglobin A1C 5.7 (A)   (A) Abnormal value       Comments are available for some flowsheets but are not being displayed.                    Assessment / Plan        Diagnoses and all orders for this visit:    1. Obesity (BMI 30-39.9) (Primary)  Assessment & Plan:  Patient's (Body mass index is 36.47 kg/m².) indicates that they are obese (BMI >30) with health conditions that include dyslipidemias, osteoarthritis and elevated A1C . Weight is newly identified. BMI  is above average; BMI management plan is completed. We discussed low calorie, low carb based diet program, portion control, increasing exercise, pharmacologic options including qsymia and ozempic and an mildred-based approach such as [employer] [place] Pal or Lose It.   -- This is a follow-up visit and patient is around the same weight as she was approximately a month ago.  -- She did bring in her journal for a brief review and did start to journal but has fallen off track over the last couple weeks.  She is interested in restarting that I highly encouraged her to try to make this a daily goal over this next month.  On the days that she did not fully complete we did notice a trend of the protein only being about half to goal.  Really focus over this next month of reaching that 98 g a day protein goal.  We brainstormed ways to start off with a protein-based fist and continue the day with protein-based snacks throughout the day.  Be mindful to keep carbs at or below goal as she does have a elevated A1c.  -- Labs reviewed.  Discussed her vitamin D insufficiency.  She has picked up her medication but has not started yet.  Advised to complete this and then restart her 2000 IUs daily of vitamin D.  -- Start Qsymia.  Prescription sent to [facility].  Patient advised that she will need  to fill out registration form on her side and we will send in prescription from our side.  -- Checking on coverage for semaglutide with her elevated reading of A1c.  Staff message sent and prescription sent in.  Patient advised to not start this medication if it is covered but instead bring to next office visit for injection instructions.    Orders:  -     Qsymia 3.75-23 MG capsule sustained-release 24 hr; Take 1 capsule by mouth Daily for 14 days.  Dispense: 14 capsule; Refill: 0  -     Qsymia 7.5-46 MG capsule sustained-release 24 hr; Take 1 capsule by mouth Daily for 30 days.  Dispense: 30 capsule; Refill: 0    2. Elevated hemoglobin A1c  -     Semaglutide,0.25 or 0.5MG/DOS, (Ozempic, 0.25 or 0.5 MG/DOSE,) 2 MG/1.5ML solution pen-injector; Inject 0.25 mg under the skin into the appropriate area as directed 1 (One) Time Per Week for 30 days. Titrate up as directed.  Dispense: 1.5 mL; Refill: 0      We discussed the risks, benefits, and limitations of treatments. Continue medications and OTC supplements as discussed. Patient verbalizes understanding of and agreement with management plan.     Follow Up   Return in about 4 weeks (around 4/27/2023).  Patient was given instructions and counseling regarding her condition or for health maintenance advice. Please see specific information pulled into the AVS if appropriate.     I spent 40 minutes on this date of service. This time includes time spent by me in the following activities:preparing for the visit, counseling and educating the patient/family/caregiver, ordering medications, tests, or procedures and documenting information in the medical record.    Shelbi Pereira, APRN  03/30/2023

## 2023-03-30 ENCOUNTER — OFFICE VISIT (OUTPATIENT)
Dept: BARIATRICS/WEIGHT MGMT | Facility: CLINIC | Age: 53
End: 2023-03-30
Payer: COMMERCIAL

## 2023-03-30 VITALS
DIASTOLIC BLOOD PRESSURE: 78 MMHG | OXYGEN SATURATION: 100 % | SYSTOLIC BLOOD PRESSURE: 120 MMHG | BODY MASS INDEX: 35.96 KG/M2 | HEART RATE: 60 BPM | WEIGHT: 215.8 LBS | HEIGHT: 65 IN

## 2023-03-30 DIAGNOSIS — R73.09 ELEVATED HEMOGLOBIN A1C: ICD-10-CM

## 2023-03-30 DIAGNOSIS — E66.9 OBESITY (BMI 30-39.9): Primary | ICD-10-CM

## 2023-03-30 PROCEDURE — 99215 OFFICE O/P EST HI 40 MIN: CPT | Performed by: NURSE PRACTITIONER

## 2023-03-30 RX ORDER — SEMAGLUTIDE 1.34 MG/ML
0.25 INJECTION, SOLUTION SUBCUTANEOUS WEEKLY
Qty: 1.5 ML | Refills: 0 | Status: SHIPPED | OUTPATIENT
Start: 2023-03-30 | End: 2023-04-29

## 2023-03-30 RX ORDER — PHENTERMINE AND TOPIRAMATE 7.5; 46 MG/1; MG/1
1 CAPSULE, EXTENDED RELEASE ORAL DAILY
Qty: 30 CAPSULE | Refills: 0 | Status: SHIPPED | OUTPATIENT
Start: 2023-03-30 | End: 2023-04-29

## 2023-03-30 RX ORDER — PHENTERMINE AND TOPIRAMATE 3.75; 23 MG/1; MG/1
1 CAPSULE, EXTENDED RELEASE ORAL DAILY
Qty: 14 CAPSULE | Refills: 0 | Status: SHIPPED | OUTPATIENT
Start: 2023-03-30 | End: 2023-04-13

## 2023-03-30 NOTE — ASSESSMENT & PLAN NOTE
Patient's (Body mass index is 36.47 kg/m².) indicates that they are obese (BMI >30) with health conditions that include dyslipidemias, osteoarthritis and elevated A1C . Weight is newly identified. BMI  is above average; BMI management plan is completed. We discussed low calorie, low carb based diet program, portion control, increasing exercise, pharmacologic options including qsymia and ozempic and an mildred-based approach such as [facility] Pal or Lose It.   -- This is a follow-up visit and patient is around the same weight as she was approximately a month ago.  -- She did bring in her journal for a brief review and did start to journal but has fallen off track over the last couple weeks.  She is interested in restarting that I highly encouraged her to try to make this a daily goal over this next month.  On the days that she did not fully complete we did notice a trend of the protein only being about half to goal.  Really focus over this next month of reaching that 98 g a day protein goal.  We brainstormed ways to start off with a protein-based fist and continue the day with protein-based snacks throughout the day.  Be mindful to keep carbs at or below goal as she does have a elevated A1c.  -- Labs reviewed.  Discussed her vitamin D insufficiency.  She has picked up her medication but has not started yet.  Advised to complete this and then restart her 2000 IUs daily of vitamin D.  -- Start Qsymia.  Prescription sent to [facility].  Patient advised that she will need to fill out registration form on her side and we will send in prescription from our side.  -- Checking on coverage for semaglutide with her elevated reading of A1c.  Staff message sent and prescription sent in.  Patient advised to not start this medication if it is covered but instead bring to next office visit for injection instructions.

## 2023-04-03 ENCOUNTER — PRIOR AUTHORIZATION (OUTPATIENT)
Dept: BARIATRICS/WEIGHT MGMT | Facility: CLINIC | Age: 53
End: 2023-04-03
Payer: COMMERCIAL

## 2023-04-03 NOTE — TELEPHONE ENCOUNTER
Marina Diaz (James: [id number])  Rx #: [id number]  Ozempic (0.25 or 0.5 MG/DOSE) 2MG/3ML pen-injectors    DENIED because you asked for the drug above for elevated and abnormal glucose. This is a use that is not on the official label.

## 2023-05-01 NOTE — PROGRESS NOTES
[facility]  [address]  [place] [address]     Office Note      Date: 2023  Patient Name: Marina Diaz  MRN: [id number]  : 1970     Subjective  Subjective     Chief Complaint  Obesity Management follow-up     Marina Diaz presents to [facility] for obesity management.   Patient is satisfied with weight loss progress. Appetite is well controlled. Reports no side effects of prescribed medications today. She currently is taking Qsymia and has titrated up to 7.5mg just a couple days ago. She is uncertain if she desires to continue this medication long term but does desire to continue currently.  She has only been on this for around 2 weeks and has just increased to the higher dose.  The patient is taking multivitamin and is not taking fish oil.  The patient is using a food journal.  The patient rates current efforts as 7 out of 10.    The patient is exercising with a FITT score of:    Frequency Intensity Time Strength Training   []   0, none []   0 []   0 [x]   0   []   1 (1-2x/week) [x]   1 (light) []   1 (<10 min) []   1 (1x/week)   [x]   2 (3-5x/week) []   2 (moderate) []   2 (10-20 min) []   2 (2x/week)   []   3 (daily) []   3 (moderately hard)  []   4 (very hard) []   3 (20-30 min)  [x]   4 (>30 min) []   3 (3-4x/week)     Review of Systems   Constitutional: Negative for appetite change and fatigue.   Eyes: Negative for blurred vision, double vision and visual disturbance.   Cardiovascular: Negative for chest pain and palpitations.   Gastrointestinal: Negative for abdominal pain, constipation, diarrhea, nausea, vomiting and GERD.   Endocrine: Negative for polydipsia, polyphagia and polyuria.   Musculoskeletal: Negative for arthralgias, back pain and myalgias.   Neurological: Negative for dizziness, tremors, light-headedness, headache and memory problem.        Parasthesias negative  "  Psychiatric/Behavioral: Negative for sleep disturbance, depressed mood and stress. The patient is not nervous/anxious.        Objective   Start weight: 215 pounds.    Total Loss lb/%Loss of beginning body weight (BBW): 12lb/-5.58%  Change in weight since last visit: -12.6    Recent Weight History:   Wt Readings from Last 6 Encounters:   05/02/23 92.3 kg (203 lb 6.4 oz)   03/30/23 97.9 kg (215 lb 12.8 oz)   02/21/23 97.6 kg (215 lb 3.2 oz)   11/28/22 96.2 kg (212 lb)   09/30/22 93 kg (205 lb)   06/27/22 90.5 kg (199 lb 9.6 oz)       Body mass index is 34.37 kg/m².   Body composition analysis completed and showed:   %body fat: 45.6  Measurements (in inches)  Waist: 42    Vital Signs:   /78   Pulse 82   Ht 163.8 cm (64.5\")   Wt 92.3 kg (203 lb 6.4 oz)   SpO2 96%   BMI 34.37 kg/m²       Physical Exam  Vitals reviewed.   Constitutional:       Appearance: She is obese. She is not ill-appearing.   Pulmonary:      Effort: Pulmonary effort is normal.   Neurological:      Mental Status: She is alert.   Psychiatric:         Attention and Perception: Attention and perception normal.         Behavior: Behavior is cooperative.        Result Review :     Most Recent A1C        2/21/2023    10:21   HGBA1C Most Recent   Hemoglobin A1C 5.7             Assessment / Plan        Diagnoses and all orders for this visit:    1. Obesity (BMI 30-39.9) (Primary)  Assessment & Plan:  Patient's (Body mass index is 34.37 kg/m².) indicates that they are obese (BMI >30) with health conditions that include dyslipidemias, osteoarthritis and elevated A1C . Weight is improving with treatment. BMI  is above average; BMI management plan is completed. We discussed low calorie, low carb based diet program, portion control, increasing exercise, pharmacologic options including qsymia and an mildred-based approach such as [employer] Pal or Lose It.     --This is a follow up visit and she is down around 12 and half pounds since last being seen " approximately a month ago.  -- We had set her #1 goal of starting to food journal which she has an excellent job and did bring in for review.  She journaled all days.  Review we are noticing that on most days she is staying with her calorie and carb goal at or below this only going over on very rare days.  Is also been focusing on reaching her protein goal which she has done many days but will continue to explore ways to reach those goals.  -- Her calorie goals currently set to 1300 we will continue this for another month and look at her body comp analysis to see if it needs to be changed next month.  -- Bring in food journal to next month for a brief review again.  --- Continue Qsymia 7.5.  We did discuss that medication is not required and she absolutely cannot lose weight without them if she desires to discontinue this in the future.  Also discussed the option of a low-dose metformin she did have an elevated A1c at last visit.    Orders:  -     Qsymia 7.5-46 MG capsule sustained-release 24 hr; Take 1 capsule by mouth Daily for 30 days.  Dispense: 30 capsule; Refill: 0      We discussed the risks, benefits, and limitations of treatments. Continue medications and OTC supplements as discussed. Patient verbalizes understanding of and agreement with management plan.     Follow Up   Return in about 4 weeks (around 5/30/2023).  Patient was given instructions and counseling regarding her condition or for health maintenance advice. Please see specific information pulled into the AVS if appropriate.     I spent 40 minutes on this date of service. This time includes time spent by me in the following activities:preparing for the visit, counseling and educating the patient/family/caregiver, ordering medications, tests, or procedures and documenting information in the medical record.    Shelbi Pereira, APRN  05/02/2023

## 2023-05-02 ENCOUNTER — OFFICE VISIT (OUTPATIENT)
Dept: BARIATRICS/WEIGHT MGMT | Facility: CLINIC | Age: 53
End: 2023-05-02
Payer: COMMERCIAL

## 2023-05-02 VITALS
HEIGHT: 65 IN | OXYGEN SATURATION: 96 % | HEART RATE: 82 BPM | WEIGHT: 203.4 LBS | SYSTOLIC BLOOD PRESSURE: 132 MMHG | DIASTOLIC BLOOD PRESSURE: 78 MMHG | BODY MASS INDEX: 33.89 KG/M2

## 2023-05-02 DIAGNOSIS — E66.9 OBESITY (BMI 30-39.9): Primary | ICD-10-CM

## 2023-05-02 RX ORDER — PHENTERMINE AND TOPIRAMATE 7.5; 46 MG/1; MG/1
1 CAPSULE, EXTENDED RELEASE ORAL DAILY
Qty: 30 CAPSULE | Refills: 0 | Status: SHIPPED | OUTPATIENT
Start: 2023-05-02 | End: 2023-06-01

## 2023-05-02 NOTE — ASSESSMENT & PLAN NOTE
Patient's (Body mass index is 34.37 kg/m².) indicates that they are obese (BMI >30) with health conditions that include dyslipidemias, osteoarthritis and elevated A1C . Weight is improving with treatment. BMI  is above average; BMI management plan is completed. We discussed low calorie, low carb based diet program, portion control, increasing exercise, pharmacologic options including qsymia and an mildred-based approach such as [employer] Pal or Lose It.     --This is a follow up visit and she is down around 12 and half pounds since last being seen approximately a month ago.  -- We had set her #1 goal of starting to food journal which she has an excellent job and did bring in for review.  She journaled all days.  Review we are noticing that on most days she is staying with her calorie and carb goal at or below this only going over on very rare days.  Is also been focusing on reaching her protein goal which she has done many days but will continue to explore ways to reach those goals.  -- Her calorie goals currently set to 1300 we will continue this for another month and look at her body comp analysis to see if it needs to be changed next month.  -- Bring in food journal to next month for a brief review again.  --- Continue Qsymia 7.5.  We did discuss that medication is not required and she absolutely cannot lose weight without them if she desires to discontinue this in the future.  Also discussed the option of a low-dose metformin she did have an elevated A1c at last visit.

## 2023-06-03 NOTE — PROGRESS NOTES
[facility]  [address]  [place] [address]     Office Note      Date: 2023  Patient Name: Marina Diaz  MRN: [id number]  : 1970    Subjective     Chief Complaint  Obesity Management follow-up    Marina Diaz presents to [facility] for obesity management.     Patient is satisfied with weight loss progress. Appetite is moderately controlled. Reports no side effects of prescribed medications today.  Reports having difficulty getting her latest prescription and was told by the pharmacy that they needed additional information from our office prior to filling.  The patient is taking multivitamin and is not taking fish oil.  The patient is using a food journal.  The patient rates current efforts as 5 out of 10.  The patient is exercising with a FITT score of:    Frequency Intensity Time Strength Training   []   0, none []   0 []   0 [x]   0   []   1 (1-2x/week) [x]   1 (light) []   1 (<10 min) []   1 (1x/week)   [x]   2 (3-5x/week) []   2 (moderate) []   2 (10-20 min) []   2 (2x/week)   []   3 (daily) []   3 (moderately hard)  []   4 (very hard) []   3 (20-30 min)  [x]   4 (>30 min) []   3 (3-4x/week)     Review of Systems   Constitutional:  Negative for appetite change and fatigue.   Eyes:  Negative for blurred vision, double vision and visual disturbance.   Cardiovascular:  Negative for chest pain and palpitations.   Gastrointestinal:  Negative for abdominal pain, constipation, diarrhea, nausea, vomiting and GERD.   Endocrine: Negative for polydipsia, polyphagia and polyuria.   Musculoskeletal:  Negative for arthralgias, back pain and myalgias.   Neurological:  Negative for dizziness, tremors, light-headedness, headache and memory problem.        Parasthesias negative   Psychiatric/Behavioral:  Negative for sleep disturbance, depressed mood and stress. The patient is not nervous/anxious.      Objective  "  Start weight: 215 pounds.    Total Loss lb/%Loss of beginning body weight (BBW): -20.8lb/-9.67%  Change in weight since last visit: -9.4    Recent Weight History:   Wt Readings from Last 6 Encounters:   06/06/23 88.1 kg (194 lb 3.2 oz)   05/02/23 92.3 kg (203 lb 6.4 oz)   03/30/23 97.9 kg (215 lb 12.8 oz)   02/21/23 97.6 kg (215 lb 3.2 oz)   11/28/22 96.2 kg (212 lb)   09/30/22 93 kg (205 lb)       Body mass index is 32.82 kg/m².   Body composition analysis completed and showed:   %body fat: 43.2  Measurements (in inches)  Waist: 41    Vital Signs:   /86   Pulse 69   Ht 163.8 cm (64.5\")   Wt 88.1 kg (194 lb 3.2 oz)   SpO2 98%   BMI 32.82 kg/m²       Physical Exam  Vitals reviewed.   Constitutional:       Appearance: She is obese. She is not ill-appearing.   Pulmonary:      Effort: Pulmonary effort is normal.   Neurological:      Mental Status: She is alert.   Psychiatric:         Attention and Perception: Attention and perception normal.         Behavior: Behavior is cooperative.      Result Review :     Most Recent A1C          2/21/2023    10:21   HGBA1C Most Recent   Hemoglobin A1C 5.7            Assessment / Plan        Diagnoses and all orders for this visit:    1. Obesity (BMI 30-39.9) (Primary)  Assessment & Plan:  Patient's (Body mass index is 32.82 kg/m².) indicates that they are obese (BMI >30) with health conditions that include dyslipidemias, osteoarthritis, and A1C  . Weight is improving with treatment. BMI  is above average; BMI management plan is completed. We discussed low calorie, low carb based diet program, portion control, increasing exercise, pharmacologic options including qsymia, and an mildred-based approach such as [employer] Pal or Lose It.   -- This is a follow-up visit and patient is down around 9 pounds since last being seen approximately a month ago.  --Restart Qsymia she has been out for about a week.  Staff message sent to check with [facility] pharmacy as patient was told they " needed additional information from our office.  Prescription resent for Qsymia.  --Patient did bring in food journal for review.  She continues to journal most days but has decreased on all the meals that she has been dealing with a stressful state family situation.  Goal this coming month is to continue food journaling diligently and continue to bring in her food journal for brief review.  --Body comp was reviewed and calorie and macro goals are still appropriate based on this information  --Continue goal of regular exercise.  Goal of at minimum 3 days a week of doing this.  We had set this as a goal last month but she did not feel like she was able to get into a good rhythm due to recent life situation.    Orders:  -     Qsymia 7.5-46 MG capsule sustained-release 24 hr; Take 1 capsule by mouth Daily for 30 days.  Dispense: 30 capsule; Refill: 0        We discussed the risks, benefits, and limitations of treatments. Continue medications and OTC supplements as discussed. Patient verbalizes understanding of and agreement with management plan.     Follow Up   Return in about 4 weeks (around 7/4/2023).  Patient was given instructions and counseling regarding her condition or for health maintenance advice. Please see specific information pulled into the AVS if appropriate.     I spent 30 minutes on this date of service. This time includes time spent by me in the following activities:preparing for the visit, counseling and educating the patient/family/caregiver, ordering medications, tests, or procedures and documenting information in the medical record.    Shelbi Pereira, LEOPOLDO  06/06/2023

## 2023-06-06 ENCOUNTER — OFFICE VISIT (OUTPATIENT)
Dept: BARIATRICS/WEIGHT MGMT | Facility: CLINIC | Age: 53
End: 2023-06-06
Payer: COMMERCIAL

## 2023-06-06 VITALS
HEIGHT: 65 IN | BODY MASS INDEX: 32.36 KG/M2 | DIASTOLIC BLOOD PRESSURE: 86 MMHG | WEIGHT: 194.2 LBS | OXYGEN SATURATION: 98 % | SYSTOLIC BLOOD PRESSURE: 136 MMHG | HEART RATE: 69 BPM

## 2023-06-06 DIAGNOSIS — E66.9 OBESITY (BMI 30-39.9): Primary | ICD-10-CM

## 2023-06-06 PROCEDURE — 99214 OFFICE O/P EST MOD 30 MIN: CPT | Performed by: NURSE PRACTITIONER

## 2023-06-06 RX ORDER — PHENTERMINE AND TOPIRAMATE 7.5; 46 MG/1; MG/1
1 CAPSULE, EXTENDED RELEASE ORAL DAILY
Qty: 30 CAPSULE | Refills: 0 | Status: SHIPPED | OUTPATIENT
Start: 2023-06-06 | End: 2023-07-06

## 2023-06-06 NOTE — ASSESSMENT & PLAN NOTE
Patient's (Body mass index is 32.82 kg/m².) indicates that they are obese (BMI >30) with health conditions that include dyslipidemias, osteoarthritis, and A1C  . Weight is improving with treatment. BMI  is above average; BMI management plan is completed. We discussed low calorie, low carb based diet program, portion control, increasing exercise, pharmacologic options including qsymia, and an mildred-based approach such as [employer] Pal or Lose It.   -- This is a follow-up visit and patient is down around 9 pounds since last being seen approximately a month ago.  --Restart Qsymia she has been out for about a week.  Staff message sent to check with [facility] pharmacy as patient was told they needed additional information from our office.  Prescription resent for Qsymia.  --Patient did bring in food journal for review.  She continues to journal most days but has decreased on all the meals that she has been dealing with a stressful state family situation.  Goal this coming month is to continue food journaling diligently and continue to bring in her food journal for brief review.  --Body comp was reviewed and calorie and macro goals are still appropriate based on this information  --Continue goal of regular exercise.  Goal of at minimum 3 days a week of doing this.  We had set this as a goal last month but she did not feel like she was able to get into a good rhythm due to recent life situation.

## 2023-06-07 ENCOUNTER — TELEPHONE (OUTPATIENT)
Dept: BARIATRICS/WEIGHT MGMT | Facility: CLINIC | Age: 53
End: 2023-06-07
Payer: COMMERCIAL

## 2023-06-07 NOTE — TELEPHONE ENCOUNTER
Called [facility] to  give addition information needed for qysmia to be filled. Pharmacy will contact patient and let her know this is done.

## 2023-08-07 RX ORDER — PHENTERMINE AND TOPIRAMATE 7.5; 46 MG/1; MG/1
1 CAPSULE, EXTENDED RELEASE ORAL DAILY
Qty: 30 CAPSULE | Refills: 0 | Status: CANCELLED | OUTPATIENT
Start: 2023-08-07 | End: 2023-09-06

## 2023-08-07 NOTE — PROGRESS NOTES
[facility]  [address]  [place] [address]     Office Note      Date: 08/10/2023  Patient Name: Marina Diaz  MRN: [id number]  : 1970    Subjective     Chief Complaint  Obesity Management follow-up    Marina Diaz presents to [facility] for obesity management.     Patient is unsure with weight loss progress. Appetite is poorly controlled. Reports no side effects of prescribed medications today. The patient is taking multivitamin and is not taking fish oil.  The patient is using a food journal.  The patient rates current efforts as 7/8 out of 10.  The patient is exercising with a FITT score of: 7    Frequency Intensity Time Strength Training   []   0, none []   0 []   0 [x]   0   []   1 (1-2x/week) [x]   1 (light) []   1 (<10 min) []   1 (1x/week)   [x]   2 (3-5x/week) []   2 (moderate) []   2 (10-20 min) []   2 (2x/week)   []   3 (daily) []   3 (moderately hard)  []   4 (very hard) []   3 (20-30 min)  [x]   4 (>30 min) []   3 (3-4x/week)     Review of Systems   Constitutional:  Negative for appetite change and fatigue.   Eyes:  Negative for blurred vision, double vision and visual disturbance.   Cardiovascular:  Negative for chest pain and palpitations.   Gastrointestinal:  Negative for abdominal pain, constipation, diarrhea, nausea, vomiting and GERD.   Endocrine: Negative for polydipsia, polyphagia and polyuria.   Musculoskeletal:  Negative for arthralgias, back pain and myalgias.   Neurological:  Negative for dizziness, tremors, light-headedness, headache and memory problem.        Parasthesias negative   Psychiatric/Behavioral:  Negative for sleep disturbance, depressed mood and stress. The patient is not nervous/anxious.      Objective   Start weight: 215 pounds.    Total Loss lb/%Loss of beginning body weight (BBW): -29.8 lb/-13.86%  Change in weight since last visit: -1.4    Recent Weight History:  "  Wt Readings from Last 6 Encounters:   08/10/23 84 kg (185 lb 3.2 oz)   07/11/23 84.6 kg (186 lb 9.6 oz)   06/06/23 88.1 kg (194 lb 3.2 oz)   05/02/23 92.3 kg (203 lb 6.4 oz)   03/30/23 97.9 kg (215 lb 12.8 oz)   02/21/23 97.6 kg (215 lb 3.2 oz)       Body mass index is 31.3 kg/mý.   Body composition analysis completed and showed:   %body fat: 42.1  Measurements (in inches)  Waist: 36    Vital Signs:   /70 (BP Location: Left arm, Patient Position: Sitting)   Pulse 68   Resp 16   Ht 163.8 cm (64.5\")   Wt 84 kg (185 lb 3.2 oz)   SpO2 99%   BMI 31.30 kg/mý       Physical Exam  Vitals reviewed.   Constitutional:       Appearance: She is obese. She is not ill-appearing.   Pulmonary:      Effort: Pulmonary effort is normal.   Neurological:      Mental Status: She is alert.   Psychiatric:         Attention and Perception: Attention and perception normal.         Behavior: Behavior is cooperative.      Result Review :     A1C Last 3 Results          2/21/2023    10:21   HGBA1C Last 3 Results   Hemoglobin A1C 5.7            Assessment / Plan        Diagnoses and all orders for this visit:    1. Obesity (BMI 30-39.9) (Primary)  Assessment & Plan:  Patient's (Body mass index is 31.3 kg/mý.) indicates that they are obese (BMI >30) with health conditions that include dyslipidemias and osteoarthritis . Weight is improving with treatment. BMI  is above average; BMI management plan is completed. We discussed low calorie, low carb based diet program, portion control, increasing exercise, pharmacologic options including qsymia, and an mildred-based approach such as [employer] Pal or Lose It.     --This is a follow up visit and she is down around  1.5 lbs  --Continue food jounral and be mindful to reach protein goal  --Increase Qsymia. Morales and UDS  --Discussed the option of adding in metformin if needed in future.       Orders:  -     Phentermine-Topiramate (Qsymia) 11.25-69 MG capsule sustained-release 24 hr; Take 11.25-69 " mg by mouth Daily for 30 days.  Dispense: 30 capsule; Refill: 0    2. Elevated hemoglobin A1c        We discussed the risks, benefits, and limitations of treatments. Continue medications and OTC supplements as discussed. Patient verbalizes understanding of and agreement with management plan.     Follow Up   Return in about 4 weeks (around 9/7/2023).  Patient was given instructions and counseling regarding her condition or for health maintenance advice. Please see specific information pulled into the AVS if appropriate.     I spent 30 minutes on this date of service. This time includes time spent by me in the following activities:preparing for the visit, counseling and educating the patient/family/caregiver, ordering medications, tests, or procedures and documenting information in the medical record.    Shelbi Pereira, LEOPOLDO  08/10/2023

## 2023-08-10 ENCOUNTER — OFFICE VISIT (OUTPATIENT)
Dept: BARIATRICS/WEIGHT MGMT | Facility: CLINIC | Age: 53
End: 2023-08-10
Payer: COMMERCIAL

## 2023-08-10 VITALS
SYSTOLIC BLOOD PRESSURE: 114 MMHG | DIASTOLIC BLOOD PRESSURE: 70 MMHG | RESPIRATION RATE: 16 BRPM | BODY MASS INDEX: 30.86 KG/M2 | HEIGHT: 65 IN | OXYGEN SATURATION: 99 % | WEIGHT: 185.2 LBS | HEART RATE: 68 BPM

## 2023-08-10 DIAGNOSIS — R73.09 ELEVATED HEMOGLOBIN A1C: ICD-10-CM

## 2023-08-10 DIAGNOSIS — E66.9 OBESITY (BMI 30-39.9): Primary | ICD-10-CM

## 2023-08-10 PROCEDURE — 99214 OFFICE O/P EST MOD 30 MIN: CPT | Performed by: NURSE PRACTITIONER

## 2023-08-10 RX ORDER — PHENTERMINE AND TOPIRAMATE 11.25; 69 MG/1; MG/1
11.25-69 CAPSULE, EXTENDED RELEASE ORAL DAILY
Qty: 30 CAPSULE | Refills: 0 | Status: SHIPPED | OUTPATIENT
Start: 2023-08-10 | End: 2023-09-09

## 2023-08-10 NOTE — ASSESSMENT & PLAN NOTE
Patient's (Body mass index is 31.3 kg/mý.) indicates that they are obese (BMI >30) with health conditions that include dyslipidemias and osteoarthritis . Weight is improving with treatment. BMI  is above average; BMI management plan is completed. We discussed low calorie, low carb based diet program, portion control, increasing exercise, pharmacologic options including qsymia, and an mildred-based approach such as [employer] Pal or Lose It.     --This is a follow up visit and she is down around  1.5 lbs  --Continue food jounral and be mindful to reach protein goal  --Increase Qsymia. Morales and ELYSSA  --Discussed the option of adding in metformin if needed in future.

## 2023-09-10 NOTE — PROGRESS NOTES
[facility]  [address]  [place] [address]     Office Note      Date: 2023  Patient Name: Marina Diaz  MRN: [id number]  : 1970    Subjective     Chief Complaint  Obesity Management follow-up    Marina Diaz presents to [facility] for obesity management.       Patient is satisfied with weight loss progress. Appetite is moderately controlled. Reports no side effects of prescribed medications today. The patient is taking multivitamin and is not taking fish oil.  The patient is using a food journal.  The patient rates current efforts as 8 out of 10. She has been trying to increase meals at home that include protein. One options she has used is rotisserie chicken and frozen salmon.  Currently journaling most days and did bring in for review.    B: boilded eggs and sausage or tuna  L tuna  D: working on making at home with vegetables.     The patient is exercising with a FITT score of: 7    Frequency Intensity Time Strength Training   []   0, none []   0 []   0 [x]   0   []   1 (1-2x/week) [x]   1 (light) []   1 (<10 min) []   1 (1x/week)   [x]   2 (3-5x/week) []   2 (moderate) []   2 (10-20 min) []   2 (2x/week)   []   3 (daily) []   3 (moderately hard)  []   4 (very hard) []   3 (20-30 min)  [x]   4 (>30 min) []   3 (3-4x/week)     Review of Systems   Constitutional:  Negative for appetite change and fatigue.        Positive for weight gain   HENT:  Negative for trouble swallowing.         Negative for throat swelling   Eyes:  Negative for blurred vision, double vision and visual disturbance.   Respiratory:  Negative for shortness of breath and wheezing.         Negative for snoring   Cardiovascular:  Negative for chest pain, palpitations and leg swelling.   Gastrointestinal:  Negative for abdominal pain, constipation, diarrhea, nausea, vomiting, GERD and indigestion.   Endocrine: Negative for cold  "intolerance, heat intolerance, polydipsia, polyphagia and polyuria.        Negative for loss of hair  Negative for hirsutism     Genitourinary:         Denies menstrual irregularities   Musculoskeletal:  Negative for arthralgias, back pain and myalgias.        Denies exercise limitations  Denies chronic pain   Skin:  Negative for dry skin.        Negative for acne   Neurological:  Negative for dizziness, tremors, light-headedness, headache and memory problem.        Negative for paresthesias   Psychiatric/Behavioral:  Negative for self-injury, sleep disturbance, suicidal ideas, depressed mood and stress. The patient is not nervous/anxious.    All other systems reviewed and are negative.    Objective   Start weight: 215 pounds.    Total Loss lb/%Loss of beginning body weight (BBW): -35.4 lb/-16.47%  Change in weight since last visit: -5.6    Recent Weight History:   Wt Readings from Last 6 Encounters:   09/12/23 81.5 kg (179 lb 9.6 oz)   08/10/23 84 kg (185 lb 3.2 oz)   07/11/23 84.6 kg (186 lb 9.6 oz)   06/06/23 88.1 kg (194 lb 3.2 oz)   05/02/23 92.3 kg (203 lb 6.4 oz)   03/30/23 97.9 kg (215 lb 12.8 oz)       Body mass index is 30.35 kg/m².   Body composition analysis completed and showed:   %body fat: 41.7  Measurements (in inches)  Waist: 37    Vital Signs:   /76 (BP Location: Left arm, Patient Position: Sitting)   Pulse 81   Resp 16   Ht 163.8 cm (64.5\")   Wt 81.5 kg (179 lb 9.6 oz)   SpO2 98%   BMI 30.35 kg/m²       Physical Exam  Vitals reviewed.   Constitutional:       Appearance: She is obese. She is not ill-appearing.   Pulmonary:      Effort: Pulmonary effort is normal.   Neurological:      Mental Status: She is alert.   Psychiatric:         Attention and Perception: Attention and perception normal.         Behavior: Behavior is cooperative.      Result Review :     CMP          9/30/2022    08:27 2/21/2023    10:21   CMP   Glucose 88  87    BUN 11  9    Creatinine 1.03  0.74    EGFR 66.0   "   Sodium 137  139    Potassium 4.6  4.3    Chloride 102  103    Calcium 9.6  8.8    Total Protein  6.5    Total Protein 7.2     Albumin 4.30  3.9    Globulin  2.6    Globulin 2.9     Total Bilirubin 0.4  0.3    Alkaline Phosphatase 55  54    AST (SGOT) 18  14    ALT (SGPT) 10  11    Albumin/Globulin Ratio 1.5     BUN/Creatinine Ratio 10.7  12    Anion Gap 10.0       Lipid Panel          9/30/2022    08:27 2/21/2023    10:21   Lipid Panel   Total Cholesterol 273     Total Cholesterol  213    Triglycerides 174  120    HDL Cholesterol 57  53    VLDL Cholesterol 32  21    LDL Cholesterol  184  139    LDL/HDL Ratio 3.18       Most Recent A1C          2/21/2023    10:21   HGBA1C Most Recent   Hemoglobin A1C 5.7               Assessment / Plan        Diagnoses and all orders for this visit:    1. Obesity (BMI 30-39.9) (Primary)  Assessment & Plan:  Patient's (Body mass index is 30.35 kg/m².) indicates that they are obese (BMI >30) with health conditions that include dyslipidemias, osteoarthritis, and elevated A1C  . Weight is improving with treatment. BMI  is above average; BMI management plan is completed. We discussed low calorie, low carb based diet program, portion control, increasing exercise, pharmacologic options including qsymia, and an mildred-based approach such as [place] Pal or Lose It.   -- This is a follow-up visit and patient is down around 5 and half pounds since last being seen approximately a month ago  --Continue Qsymia refill sent in.  --She has been working hard at making sure she has food at home for dinnertime including protein such as chicken or salmon.  Keep up this great effort for preparing and having those foods easily available.  --She continues to food journal and is doing an excellent job with this and did bring in for review.  Keep up this great effort.  Her trends show that she is staying at or below calorie and carb goals practically every day she is also reaching her protein and fiber goals  daily as well.  But this great effort.  --A1c ordered to be drawn in the future with additional labs that may be needed at her next primary care provider office.  We discussed that if A1c is still elevated we might consider adding metformin but if within normal range we should be okay.    Orders:  -     Phentermine-Topiramate (Qsymia) 11.25-69 MG capsule sustained-release 24 hr; Take 11.25-69 mg by mouth Daily for 30 days.  Dispense: 30 capsule; Refill: 0  -     Hemoglobin A1c; Future    2. Elevated hemoglobin A1c  -     Hemoglobin A1c; Future        We discussed the risks, benefits, and limitations of treatments. Continue medications and OTC supplements as discussed. Patient verbalizes understanding of and agreement with management plan.     Follow Up   Return in about 4 weeks (around 10/10/2023).  Patient was given instructions and counseling regarding her condition or for health maintenance advice. Please see specific information pulled into the AVS if appropriate.     I spent 30 minutes on this date of service. This time includes time spent by me in the following activities:preparing for the visit, counseling and educating the patient/family/caregiver, ordering medications, tests, or procedures and documenting information in the medical record.    Shelbi Pereira, APRN  09/12/2023

## 2023-09-12 ENCOUNTER — OFFICE VISIT (OUTPATIENT)
Dept: BARIATRICS/WEIGHT MGMT | Facility: CLINIC | Age: 53
End: 2023-09-12
Payer: COMMERCIAL

## 2023-09-12 VITALS
RESPIRATION RATE: 16 BRPM | DIASTOLIC BLOOD PRESSURE: 76 MMHG | OXYGEN SATURATION: 98 % | SYSTOLIC BLOOD PRESSURE: 132 MMHG | HEART RATE: 81 BPM | WEIGHT: 179.6 LBS | HEIGHT: 65 IN | BODY MASS INDEX: 29.92 KG/M2

## 2023-09-12 DIAGNOSIS — R73.09 ELEVATED HEMOGLOBIN A1C: ICD-10-CM

## 2023-09-12 DIAGNOSIS — E66.9 OBESITY (BMI 30-39.9): Primary | ICD-10-CM

## 2023-09-12 PROCEDURE — 99214 OFFICE O/P EST MOD 30 MIN: CPT | Performed by: NURSE PRACTITIONER

## 2023-09-12 RX ORDER — PHENTERMINE AND TOPIRAMATE 11.25; 69 MG/1; MG/1
11.25-69 CAPSULE, EXTENDED RELEASE ORAL DAILY
Qty: 30 CAPSULE | Refills: 0 | Status: SHIPPED | OUTPATIENT
Start: 2023-09-12 | End: 2023-10-12

## 2023-09-12 NOTE — ASSESSMENT & PLAN NOTE
Patient's (Body mass index is 30.35 kg/m².) indicates that they are obese (BMI >30) with health conditions that include dyslipidemias, osteoarthritis, and elevated A1C  . Weight is improving with treatment. BMI  is above average; BMI management plan is completed. We discussed low calorie, low carb based diet program, portion control, increasing exercise, pharmacologic options including qsymia, and an mildred-based approach such as [place] Pal or Lose It.   -- This is a follow-up visit and patient is down around 5 and half pounds since last being seen approximately a month ago  --Continue Qsymia refill sent in.  --She has been working hard at making sure she has food at home for dinnertime including protein such as chicken or salmon.  Keep up this great effort for preparing and having those foods easily available.  --She continues to food journal and is doing an excellent job with this and did bring in for review.  Keep up this great effort.  Her trends show that she is staying at or below calorie and carb goals practically every day she is also reaching her protein and fiber goals daily as well.  But this great effort.  --A1c ordered to be drawn in the future with additional labs that may be needed at her next primary care provider office.  We discussed that if A1c is still elevated we might consider adding metformin but if within normal range we should be okay.

## 2023-09-19 ENCOUNTER — TELEPHONE (OUTPATIENT)
Dept: INTERNAL MEDICINE | Facility: CLINIC | Age: 53
End: 2023-09-19

## 2023-09-19 DIAGNOSIS — R73.09 ELEVATED HEMOGLOBIN A1C: Primary | ICD-10-CM

## 2023-09-19 DIAGNOSIS — Z00.00 HEALTH CARE MAINTENANCE: ICD-10-CM

## 2023-09-19 NOTE — TELEPHONE ENCOUNTER
Caller: Marina Diaz    Relationship: Self    Best call back number: [phone]    What orders are you requesting (i.e. lab or imaging): LABS     In what timeframe would the patient need to come in: WEEK BEFORE 10/04    Where will you receive your lab/imaging services: IN OFFICE     Additional notes: PATIENT IS WANTING TO COMPLETE HER YEARLY LABS BEFORE HER VISIT ON 10/04

## 2023-09-25 ENCOUNTER — LAB (OUTPATIENT)
Dept: INTERNAL MEDICINE | Facility: CLINIC | Age: 53
End: 2023-09-25

## 2023-09-25 DIAGNOSIS — E78.00 PURE HYPERCHOLESTEROLEMIA: Primary | ICD-10-CM

## 2023-09-25 LAB
ALBUMIN SERPL-MCNC: 4.1 G/DL (ref 3.5–5.2)
ALBUMIN/GLOB SERPL: 1.2 G/DL
ALP SERPL-CCNC: 54 U/L (ref 39–117)
ALT SERPL W P-5'-P-CCNC: 15 U/L (ref 1–33)
ANION GAP SERPL CALCULATED.3IONS-SCNC: 14.5 MMOL/L (ref 5–15)
AST SERPL-CCNC: 26 U/L (ref 1–32)
BILIRUB SERPL-MCNC: 0.4 MG/DL (ref 0–1.2)
BUN SERPL-MCNC: 10 MG/DL (ref 6–20)
BUN/CREAT SERPL: 10.4 (ref 7–25)
CALCIUM SPEC-SCNC: 9.3 MG/DL (ref 8.6–10.5)
CHLORIDE SERPL-SCNC: 104 MMOL/L (ref 98–107)
CHOLEST SERPL-MCNC: 222 MG/DL (ref 0–200)
CO2 SERPL-SCNC: 18.5 MMOL/L (ref 22–29)
CREAT SERPL-MCNC: 0.96 MG/DL (ref 0.57–1)
DEPRECATED RDW RBC AUTO: 39 FL (ref 37–54)
EGFRCR SERPLBLD CKD-EPI 2021: 71.3 ML/MIN/1.73
ERYTHROCYTE [DISTWIDTH] IN BLOOD BY AUTOMATED COUNT: 12.9 % (ref 12.3–15.4)
GLOBULIN UR ELPH-MCNC: 3.5 GM/DL
GLUCOSE SERPL-MCNC: 69 MG/DL (ref 65–99)
HCT VFR BLD AUTO: 38.5 % (ref 34–46.6)
HDLC SERPL-MCNC: 55 MG/DL (ref 40–60)
HGB BLD-MCNC: 13 G/DL (ref 12–15.9)
LDLC SERPL CALC-MCNC: 146 MG/DL (ref 0–100)
LDLC/HDLC SERPL: 2.6 {RATIO}
MCH RBC QN AUTO: 28.4 PG (ref 26.6–33)
MCHC RBC AUTO-ENTMCNC: 33.8 G/DL (ref 31.5–35.7)
MCV RBC AUTO: 84.1 FL (ref 79–97)
PLATELET # BLD AUTO: 251 10*3/MM3 (ref 140–450)
PMV BLD AUTO: 11.2 FL (ref 6–12)
POTASSIUM SERPL-SCNC: 3.8 MMOL/L (ref 3.5–5.2)
PROT SERPL-MCNC: 7.6 G/DL (ref 6–8.5)
RBC # BLD AUTO: 4.58 10*6/MM3 (ref 3.77–5.28)
SODIUM SERPL-SCNC: 137 MMOL/L (ref 136–145)
TRIGL SERPL-MCNC: 119 MG/DL (ref 0–150)
VLDLC SERPL-MCNC: 21 MG/DL (ref 5–40)
WBC NRBC COR # BLD: 5.06 10*3/MM3 (ref 3.4–10.8)

## 2023-09-25 PROCEDURE — 80053 COMPREHEN METABOLIC PANEL: CPT | Performed by: INTERNAL MEDICINE

## 2023-09-25 PROCEDURE — 85027 COMPLETE CBC AUTOMATED: CPT | Performed by: INTERNAL MEDICINE

## 2023-09-25 PROCEDURE — 80061 LIPID PANEL: CPT | Performed by: INTERNAL MEDICINE

## 2023-09-25 PROCEDURE — 83036 HEMOGLOBIN GLYCOSYLATED A1C: CPT | Performed by: INTERNAL MEDICINE

## 2023-09-25 PROCEDURE — 36415 COLL VENOUS BLD VENIPUNCTURE: CPT | Performed by: INTERNAL MEDICINE

## 2023-09-26 LAB — HBA1C MFR BLD: 5.4 % (ref 4.8–5.6)

## 2023-10-12 ENCOUNTER — OFFICE VISIT (OUTPATIENT)
Dept: BARIATRICS/WEIGHT MGMT | Facility: CLINIC | Age: 53
End: 2023-10-12
Payer: COMMERCIAL

## 2023-10-12 VITALS
SYSTOLIC BLOOD PRESSURE: 114 MMHG | BODY MASS INDEX: 29.29 KG/M2 | HEIGHT: 65 IN | HEART RATE: 65 BPM | WEIGHT: 175.8 LBS | DIASTOLIC BLOOD PRESSURE: 78 MMHG | OXYGEN SATURATION: 98 %

## 2023-10-12 DIAGNOSIS — E66.9 OBESITY (BMI 30-39.9): Primary | ICD-10-CM

## 2023-10-12 DIAGNOSIS — E66.3 OVERWEIGHT (BMI 25.0-29.9): ICD-10-CM

## 2023-10-12 RX ORDER — PHENTERMINE AND TOPIRAMATE 11.25; 69 MG/1; MG/1
11.25-69 CAPSULE, EXTENDED RELEASE ORAL DAILY
Qty: 30 CAPSULE | Refills: 0 | Status: SHIPPED | OUTPATIENT
Start: 2023-10-12 | End: 2023-11-11

## 2023-10-12 NOTE — PROGRESS NOTES
[facility]  [address]  [place] [address]     Office Note      Date: 10/12/2023  Patient Name: Marina Diaz  MRN: [id number]  : 1970    Subjective     Chief Complaint  Obesity Management follow-up    Marina Diaz presents to [facility] for obesity management.     Patient is satisfied with weight loss progress. Appetite is moderately controlled. Reports no side effects of prescribed medications today. The patient is taking multivitamin and is not taking fish oil.  The patient is using a food journal.  The patient rates current efforts as 6 out of 10. She feels that her food choices were on track for around 75% of the time. She did have one day she indulged.     The patient is exercising with a FITT score of:    Frequency Intensity Time Strength Training   []   0, none []   0 []   0 [x]   0   []   1 (1-2x/week) [x]   1 (light) []   1 (<10 min) []   1 (1x/week)   [x]   2 (3-5x/week) []   2 (moderate) []   2 (10-20 min) []   2 (2x/week)   []   3 (daily) []   3 (moderately hard)  []   4 (very hard) []   3 (20-30 min)  [x]   4 (>30 min) []   3 (3-4x/week)     Review of Systems   Constitutional:  Negative for appetite change and fatigue.   Eyes:  Negative for blurred vision, double vision and visual disturbance.   Cardiovascular:  Negative for chest pain and palpitations.   Gastrointestinal:  Negative for abdominal pain, constipation, diarrhea, nausea, vomiting and GERD.   Endocrine: Negative for polydipsia, polyphagia and polyuria.   Musculoskeletal:  Negative for arthralgias, back pain and myalgias.   Neurological:  Negative for dizziness, tremors, light-headedness, headache and memory problem.        Parasthesias negative   Psychiatric/Behavioral:  Negative for sleep disturbance, depressed mood and stress. The patient is not nervous/anxious.        Objective   Start weight: 215 pounds.    Total Loss lb/%Loss  "of beginning body weight (BBW): -39.2lb/-18.23%  Change in weight since last visit: -4.1    Recent Weight History:   Wt Readings from Last 6 Encounters:   10/12/23 79.7 kg (175 lb 12.8 oz)   09/12/23 81.5 kg (179 lb 9.6 oz)   08/10/23 84 kg (185 lb 3.2 oz)   07/11/23 84.6 kg (186 lb 9.6 oz)   06/06/23 88.1 kg (194 lb 3.2 oz)   05/02/23 92.3 kg (203 lb 6.4 oz)       Body mass index is 29.71 kg/mý.   Body composition analysis completed and showed:   %body fat: 41.2  Measurements (in inches)  Waist: 38.5    Vital Signs:   /78 (BP Location: Left arm, Patient Position: Sitting)   Pulse 65   Ht 163.8 cm (64.5\")   Wt 79.7 kg (175 lb 12.8 oz)   SpO2 98%   BMI 29.71 kg/mý       Physical Exam  Vitals reviewed.   Constitutional:       Appearance: She is not ill-appearing.   Pulmonary:      Effort: Pulmonary effort is normal.   Neurological:      Mental Status: She is alert.   Psychiatric:         Attention and Perception: Attention and perception normal.         Behavior: Behavior is cooperative.        Result Review :                Assessment / Plan        Diagnoses and all orders for this visit:    1. Obesity (BMI 30-39.9) (Primary)  Assessment & Plan:  Patient's (Body mass index is 29.71 kg/mý.) indicates that they are overweight with health conditions that include dyslipidemias and osteoarthritis . Weight is improving with treatment. BMI is is above average; BMI management plan is completed. We discussed low calorie, low carb based diet program, portion control, increasing exercise, pharmacologic options including qsymia, and an mildred-based approach such as [employer] Pal or Lose It.     --This is a follow up and she is down around 4 lbs since last being seen a month ago  --Continue Qsymia  --Be mindful to continue evening exercise. She is starting to plan exercise in evening, possibly the gym.  --BMI is now below 30  --Start psyllium husk         2. Overweight (BMI 25.0-29.9)  Assessment & Plan:  Patient's (Body " mass index is 29.71 kg/mý.) indicates that they are overweight with health conditions that include dyslipidemias and osteoarthritis . Weight is improving with treatment. BMI is is above average; BMI management plan is completed. We discussed low calorie, low carb based diet program, portion control, increasing exercise, pharmacologic options including qsymia, and an mildred-based approach such as [employer] Pal or Lose It.     --This is a follow up and she is down around 4 lbs since last being seen a month ago  --Continue Qsymia  --Be mindful to continue evening exercise. She is starting to plan exercise in evening, possibly the gym.  --BMI is now below 30  --Start psyllium husk       Orders:  -     Phentermine-Topiramate (Qsymia) 11.25-69 MG capsule sustained-release 24 hr; Take 11.25-69 mg by mouth Daily for 30 days.  Dispense: 30 capsule; Refill: 0    Other orders  -     psyllium (METAMUCIL) 0.52 g capsule; Take 2 capsules by mouth 2 (Two) Times a Day. Titrate up as directed. Take with a minimum of 8oz water.        We discussed the risks, benefits, and limitations of treatments. Continue medications and OTC supplements as discussed. Patient verbalizes understanding of and agreement with management plan.     Follow Up   No follow-ups on file.  Patient was given instructions and counseling regarding her condition or for health maintenance advice. Please see specific information pulled into the AVS if appropriate.     I spent 30 minutes on this date of service. This time includes time spent by me in the following activities:preparing for the visit, counseling and educating the patient/family/caregiver, ordering medications, tests, or procedures and documenting information in the medical record.    Shelbi Pereira, APRN  10/12/2023

## 2023-10-12 NOTE — ASSESSMENT & PLAN NOTE
Patient's (Body mass index is 29.71 kg/mý.) indicates that they are overweight with health conditions that include dyslipidemias and osteoarthritis . Weight is improving with treatment. BMI is is above average; BMI management plan is completed. We discussed low calorie, low carb based diet program, portion control, increasing exercise, pharmacologic options including qsymia, and an mildred-based approach such as [employer] Pal or Lose It.     --This is a follow up and she is down around 4 lbs since last being seen a month ago  --Continue Qsymia  --Be mindful to continue evening exercise. She is starting to plan exercise in evening, possibly the gym.  --BMI is now below 30  --Start psyllium husk   --A1C reviewed and not within normal range.

## 2023-10-24 ENCOUNTER — OFFICE VISIT (OUTPATIENT)
Dept: INTERNAL MEDICINE | Facility: CLINIC | Age: 53
End: 2023-10-24
Payer: COMMERCIAL

## 2023-10-24 VITALS
HEIGHT: 65 IN | WEIGHT: 179 LBS | DIASTOLIC BLOOD PRESSURE: 82 MMHG | OXYGEN SATURATION: 100 % | HEART RATE: 83 BPM | SYSTOLIC BLOOD PRESSURE: 110 MMHG | BODY MASS INDEX: 29.82 KG/M2

## 2023-10-24 DIAGNOSIS — Z00.00 HEALTH CARE MAINTENANCE: Primary | ICD-10-CM

## 2023-10-24 PROCEDURE — 99396 PREV VISIT EST AGE 40-64: CPT | Performed by: INTERNAL MEDICINE

## 2023-10-24 NOTE — PROGRESS NOTES
Subjective   Marina Diaz is a 53 y.o. female here for yearly physical.  No new complaints.  Has been steadily losing weight via the weight management clinic.    Past Medical History:   Diagnosis Date    Abdominal hernia     per pt    Arthritis     Elevated hemoglobin A1c     Fibroids     Heart murmur     Hyperlipemia     Hyperlipemia 02/21/2023    Osteoarthritis     Vitamin D insufficiency     Wears glasses      Family History   Problem Relation Age of Onset    Hypertension Mother     Obesity Mother     Cancer Mother         unspecified    Arthritis Mother     Diabetes Mother     No Known Problems Father     No Known Problems Sister     No Known Problems Brother     No Known Problems Daughter     No Known Problems Son     No Known Problems Maternal Aunt     No Known Problems Maternal Uncle     No Known Problems Paternal Aunt     No Known Problems Paternal Uncle     Cancer Maternal Grandmother         unspecified    No Known Problems Maternal Grandfather     No Known Problems Paternal Grandmother     No Known Problems Paternal Grandfather     Cancer Other     Diabetes Other      Past Surgical History:   Procedure Laterality Date    BREAST BIOPSY      Left    BREAST LUMPECTOMY Bilateral 2007    COLONOSCOPY  2018    FOOT SURGERY      TOTAL ABDOMINAL HYSTERECTOMY N/A 05/29/2019    Procedure: TOTAL ABDOMINAL HYSTERECTOMY WITH BILATERAL SALPINGECTOMY;  Surgeon: Marjorie Deng MD;  Location: [facility];  Service: Obstetrics/Gynecology     Social History     Socioeconomic History    Marital status: Single   Tobacco Use    Smoking status: Never     Passive exposure: Never    Smokeless tobacco: Never   Vaping Use    Vaping Use: Never used   Substance and Sexual Activity    Alcohol use: Yes     Comment: Rare    Drug use: No    Sexual activity: Not Currently     Birth control/protection: Hysterectomy         Current Outpatient Medications:     Cholecalciferol (VITAMIN D3) 2000 UNITS tablet, Take 1 tablet by  "mouth Daily., Disp: , Rfl:     multivitamin with minerals (Multivitamin Adults) tablet tablet, Take 1 tablet by mouth Daily., Disp: 30 tablet, Rfl: 2    Phentermine-Topiramate (Qsymia) 11.25-69 MG capsule sustained-release 24 hr, Take 11.25-69 mg by mouth Daily for 30 days., Disp: 30 capsule, Rfl: 0    Probiotic Product (PROBIOTIC DAILY PO), Probiotic, Disp: , Rfl:     psyllium (METAMUCIL) 0.52 g capsule, Take 2 capsules by mouth 2 (Two) Times a Day. Titrate up as directed. Take with a minimum of 8oz water., Disp: , Rfl:     Objective   /82 (BP Location: Left arm)   Pulse 83   Ht 163.8 cm (64.5\")   Wt 81.2 kg (179 lb)   SpO2 100%   BMI 30.25 kg/m²   Physical Exam  Vitals reviewed.   Constitutional:       General: She is not in acute distress.     Appearance: Normal appearance. She is well-developed.   HENT:      Head: Normocephalic and atraumatic.      Right Ear: Tympanic membrane, ear canal and external ear normal.      Left Ear: Tympanic membrane, ear canal and external ear normal.      Nose: Nose normal.      Mouth/Throat:      Lips: Pink.      Mouth: Mucous membranes are moist.      Tongue: No lesions.      Pharynx: Oropharynx is clear.   Eyes:      General: Lids are normal. Vision grossly intact. No scleral icterus.     Conjunctiva/sclera: Conjunctivae normal.      Pupils: Pupils are equal, round, and reactive to light.   Neck:      Thyroid: No thyroid mass, thyromegaly or thyroid tenderness.      Vascular: No carotid bruit.   Cardiovascular:      Rate and Rhythm: Normal rate and regular rhythm.      Heart sounds: Normal heart sounds. No murmur heard.     No friction rub. No gallop. No S3 or S4 sounds.   Pulmonary:      Effort: Pulmonary effort is normal.      Breath sounds: Normal breath sounds. No wheezing, rhonchi or rales.   Abdominal:      General: Bowel sounds are normal. There is no distension.      Palpations: Abdomen is soft. There is no mass.      Tenderness: There is no abdominal " tenderness.   Musculoskeletal:         General: Normal range of motion.      Cervical back: Neck supple.      Right lower leg: No edema.      Left lower leg: No edema.   Lymphadenopathy:      Cervical: No cervical adenopathy.   Skin:     General: Skin is warm and dry.      Coloration: Skin is not pale.      Findings: No erythema or rash.   Neurological:      Mental Status: She is alert and oriented to person, place, and time. Mental status is at baseline.      Cranial Nerves: No cranial nerve deficit.      Sensory: No sensory deficit.      Gait: Gait is intact.      Comments: CNs grossly intact   Psychiatric:         Attention and Perception: Attention normal.         Mood and Affect: Mood normal.         Speech: Speech normal.         Behavior: Behavior normal.         Thought Content: Thought content normal.         Judgment: Judgment normal.         Assessment & Plan   Diagnoses and all orders for this visit:    1. Health care maintenance (Primary)      1. Health Care Maintenance  -pap UTD  -mamm UTD  -colon cancer screening UTD  -fasting labs  -BMI is >= 30 and <35. (Class 1 Obesity). The following options were offered after discussion;: referral to a weight management program patient making good progress on her weight loss with the weight management clinic    Reviewed the following with the patient: advised patient of need for:  pap smear, mammogram, colonoscopy, and immunizations discussed, encouraged patient to exercise 5-7 days per week for 30 minutes at a time, and weight loss encouraged.  Counseled regarding: age-appropriate screening labs and tests, wearing seatbelt and sunscreen regularly  Discussed: regular exercise and diet changes to promote healthy weight, checking skin regularly for abnormal moles and lesions

## 2023-11-09 NOTE — PROGRESS NOTES
[facility]  [address]  [place] [address]     Office Note      Date: 2023  Patient Name: Marina Diaz  MRN: [id number]  : 1970    Subjective     Chief Complaint  Obesity Management follow-up    Marina Diaz presents to [facility] for obesity management.     Patient is satisfied with weight loss progress. Appetite is moderately controlled. Reports no side effects of prescribed medications today. The patient is taking multivitamin and is not taking fish oil.  The patient is using a food journal.  The patient rates current efforts as 5 out of 10.  Reports her exercise has been down related to cooler weather and sunsetting earlier.  Reports preparing foods less convenience.    The patient is exercising with a FITT score of:    Frequency Intensity Time Strength Training   []   0, none [x]   0 []   0 [x]   0   []   1 (1-2x/week) []   1 (light) []   1 (<10 min) []   1 (1x/week)   [x]   2 (3-5x/week) []   2 (moderate) []   2 (10-20 min) []   2 (2x/week)   []   3 (daily) []   3 (moderately hard)  []   4 (very hard) []   3 (20-30 min)  [x]   4 (>30 min) []   3 (3-4x/week)     Review of Systems   Constitutional:  Negative for appetite change and fatigue.   Eyes:  Negative for blurred vision, double vision and visual disturbance.   Cardiovascular:  Negative for chest pain and palpitations.   Gastrointestinal:  Negative for abdominal pain, constipation, diarrhea, nausea, vomiting and GERD.   Endocrine: Negative for polydipsia, polyphagia and polyuria.   Musculoskeletal:  Negative for arthralgias, back pain and myalgias.   Neurological:  Negative for dizziness, tremors, light-headedness, headache and memory problem.        Parasthesias negative   Psychiatric/Behavioral:  Negative for sleep disturbance, depressed mood and stress. The patient is not nervous/anxious.      Objective   Start weight: 215 pounds.   "  Total Loss lb/%Loss of beginning body weight (BBW): -40.4lb/-18.79%  Change in weight since last visit: -1.4    Recent Weight History:   Wt Readings from Last 6 Encounters:   11/13/23 79.2 kg (174 lb 9.6 oz)   10/24/23 81.2 kg (179 lb)   10/12/23 79.7 kg (175 lb 12.8 oz)   09/12/23 81.5 kg (179 lb 9.6 oz)   08/10/23 84 kg (185 lb 3.2 oz)   07/11/23 84.6 kg (186 lb 9.6 oz)     Body mass index is 29.51 kg/m².   Body composition analysis completed and showed:   Body Fat %: 41.8    Measurements (in inches)  Waist Circumference: 38    Vital Signs:   /82 (BP Location: Left arm, Patient Position: Sitting)   Pulse 56   Ht 163.8 cm (64.5\")   Wt 79.2 kg (174 lb 9.6 oz)   SpO2 95%   BMI 29.51 kg/m²       Physical Exam  Vitals reviewed.   Constitutional:       Appearance: She is well-developed.      Comments: overweight   Pulmonary:      Effort: Pulmonary effort is normal.   Neurological:      Mental Status: She is alert.   Psychiatric:         Attention and Perception: Attention normal.         Mood and Affect: Mood normal.         Speech: Speech normal.         Behavior: Behavior normal.        Result Review :     Common labs          2/21/2023    10:21 9/25/2023    09:36   Common Labs   Glucose 87  69    BUN 9  10    Creatinine 0.74  0.96    Sodium 139  137    Potassium 4.3  3.8    Chloride 103  104    Calcium 8.8  9.3    Total Protein 6.5     Albumin 3.9  4.1    Total Bilirubin 0.3  0.4    Alkaline Phosphatase 54  54    AST (SGOT) 14  26    ALT (SGPT) 11  15    WBC 6.5  5.06    Hemoglobin 12.0  13.0    Hematocrit 35.2  38.5    Platelets 258  251    Total Cholesterol  222    Total Cholesterol 213     Triglycerides 120  119    HDL Cholesterol 53  55    LDL Cholesterol  139  146    Hemoglobin A1C 5.7  5.40               Assessment / Plan        Diagnoses and all orders for this visit:    1. Overweight (BMI 25.0-29.9) (Primary)  Assessment & Plan:  Patient's (Body mass index is 29.51 kg/m².) indicates that they are " overweight with health conditions that include dyslipidemias and osteoarthritis . Weight is improving with treatment. BMI is is above average; BMI management plan is completed. We discussed low calorie, low carb based diet program, portion control, increasing exercise, pharmacologic options including qsymia, and an mildred-based approach such as e[employer] Pal or Lose It.     --This is a follow up and pt is down around 1.4 lbs  --Continue Qsymia 11.25  --Gaol to restart logging and prepping foods at home.   --Gaol to restart walking 3-5 days per week. Goal to do this at work at lunch.     Orders:  -     Phentermine-Topiramate (Qsymia) 11.25-69 MG capsule sustained-release 24 hr; Take 11.25-69 mg by mouth Daily for 30 days.  Dispense: 30 capsule; Refill: 0        We discussed the risks, benefits, and limitations of treatments. Continue medications and OTC supplements as discussed. Patient verbalizes understanding of and agreement with management plan.     Follow Up   No follow-ups on file.  Patient was given instructions and counseling regarding her condition or for health maintenance advice. Please see specific information pulled into the AVS if appropriate.     I spent 30 minutes on this date of service. This time includes time spent by me in the following activities:preparing for the visit, counseling and educating the patient/family/caregiver, ordering medications, tests, or procedures and documenting information in the medical record.    Shelbi Pereira, APRN  11/13/2023

## 2023-11-13 ENCOUNTER — OFFICE VISIT (OUTPATIENT)
Dept: BARIATRICS/WEIGHT MGMT | Facility: CLINIC | Age: 53
End: 2023-11-13
Payer: COMMERCIAL

## 2023-11-13 VITALS
OXYGEN SATURATION: 95 % | BODY MASS INDEX: 29.09 KG/M2 | HEIGHT: 65 IN | SYSTOLIC BLOOD PRESSURE: 120 MMHG | WEIGHT: 174.6 LBS | HEART RATE: 56 BPM | DIASTOLIC BLOOD PRESSURE: 82 MMHG

## 2023-11-13 DIAGNOSIS — E66.3 OVERWEIGHT (BMI 25.0-29.9): Primary | ICD-10-CM

## 2023-11-13 RX ORDER — PHENTERMINE AND TOPIRAMATE 11.25; 69 MG/1; MG/1
11.25-69 CAPSULE, EXTENDED RELEASE ORAL DAILY
Qty: 30 CAPSULE | Refills: 0 | Status: SHIPPED | OUTPATIENT
Start: 2023-11-13 | End: 2023-12-13

## 2023-11-13 NOTE — ASSESSMENT & PLAN NOTE
Patient's (Body mass index is 29.51 kg/m².) indicates that they are overweight with health conditions that include dyslipidemias and osteoarthritis . Weight is improving with treatment. BMI is is above average; BMI management plan is completed. We discussed low calorie, low carb based diet program, portion control, increasing exercise, pharmacologic options including qsymia, and an mildred-based approach such as [employer] Pal or Lose It.     --This is a follow up and pt is down around 1.4 lbs  --Continue Qsymia 11.25  --Gaol to restart logging and prepping foods at home.   --Gaol to restart walking 3-5 days per week. Goal to do this at work at lunch.

## 2023-12-18 NOTE — PROGRESS NOTES
[facility]  [address]  [place] [address]     Office Note      Date: 2023  Patient Name: Marina Diaz  MRN: [id number]  : 1970    Subjective     Chief Complaint  Obesity Management follow-up    Marina Diaz presents to [facility] for obesity management.     Patient is satisfied with weight loss progress. Appetite is moderately controlled. Reports no side effects of prescribed medications today. She is taking Qsymia 11mg. The patient is taking multivitamin and is not taking fish oil.  The patient is not using a food journal.  The patient rates current efforts as 5 out of 10.    The patient is exercising with a FITT score of:    Frequency Intensity Time Strength Training   []   0, none []   0 []   0 [x]   0   [x]   1 (1-2x/week) [x]   1 (light) []   1 (<10 min) []   1 (1x/week)   []   2 (3-5x/week) []   2 (moderate) []   2 (10-20 min) []   2 (2x/week)   []   3 (daily) []   3 (moderately hard)  []   4 (very hard) []   3 (20-30 min)  [x]   4 (>30 min) []   3 (3-4x/week)     Review of Systems   Constitutional:  Negative for appetite change and fatigue.   Eyes:  Negative for visual disturbance.   Cardiovascular:  Negative for chest pain and palpitations.   Gastrointestinal:  Negative for constipation and indigestion.   Neurological:  Negative for light-headedness.   All other systems reviewed and are negative.      Objective   Start weight: 215 pounds.    Total Loss lb/%Loss of beginning body weight (BBW): -41.4 lb/-19.26%  Change in weight since last visit: -1    Recent Weight History:   Wt Readings from Last 6 Encounters:   23 78.7 kg (173 lb 9.6 oz)   23 79.2 kg (174 lb 9.6 oz)   10/24/23 81.2 kg (179 lb)   10/12/23 79.7 kg (175 lb 12.8 oz)   23 81.5 kg (179 lb 9.6 oz)   08/10/23 84 kg (185 lb 3.2 oz)     Body mass index is 29.34 kg/m².   Body composition analysis completed and showed:  "  Body Fat %: 41.1    Measurements (in inches)  Waist Circumference: 34.5    Vital Signs:   /84 (BP Location: Left arm, Patient Position: Sitting)   Pulse 80   Resp 16   Ht 163.8 cm (64.5\")   Wt 78.7 kg (173 lb 9.6 oz)   SpO2 99%   BMI 29.34 kg/m²       Physical Exam  Vitals reviewed.   Constitutional:       Appearance: She is well-developed.      Comments: overweight   Pulmonary:      Effort: Pulmonary effort is normal.   Neurological:      Mental Status: She is alert.   Psychiatric:         Attention and Perception: Attention normal.         Mood and Affect: Mood normal.         Speech: Speech normal.         Behavior: Behavior normal.        Result Review :     Common labs          2/21/2023    10:21 9/25/2023    09:36   Common Labs   Glucose 87  69    BUN 9  10    Creatinine 0.74  0.96    Sodium 139  137    Potassium 4.3  3.8    Chloride 103  104    Calcium 8.8  9.3    Total Protein 6.5     Albumin 3.9  4.1    Total Bilirubin 0.3  0.4    Alkaline Phosphatase 54  54    AST (SGOT) 14  26    ALT (SGPT) 11  15    WBC 6.5  5.06    Hemoglobin 12.0  13.0    Hematocrit 35.2  38.5    Platelets 258  251    Total Cholesterol  222    Total Cholesterol 213     Triglycerides 120  119    HDL Cholesterol 53  55    LDL Cholesterol  139  146    Hemoglobin A1C 5.7  5.40            Assessment / Plan        Diagnoses and all orders for this visit:    1. Overweight (BMI 25.0-29.9) (Primary)  Assessment & Plan:  Patient's (Body mass index is 29.34 kg/m².) indicates that they are overweight with health conditions that include dyslipidemias, osteoarthritis, and elevated A1C  . Weight is improving with treatment. BMI is is above average; BMI management plan is completed. We discussed low calorie, low carb based diet program, portion control, increasing exercise, pharmacologic options including qsymia, and an mildred-based approach such as [employer] Pal or Lose It.     --This is a follow up visit and she is down around a " pound  --Continue qsymia. May increase dose at next office visit  --Gaol to manage stress in life and not gain weight over the holidays.   --Goal to continue food journaling   --UDA due at next visit    Orders:  -     Phentermine-Topiramate (Qsymia) 11.25-69 MG capsule sustained-release 24 hr; Take 11.25-69 mg by mouth Daily for 30 days.  Dispense: 30 capsule; Refill: 0        We discussed the risks, benefits, and limitations of treatments. Continue medications and OTC supplements as discussed. Patient verbalizes understanding of and agreement with management plan.     Follow Up   Return in about 4 weeks (around 1/16/2024).  Patient was given instructions and counseling regarding her condition or for health maintenance advice. Please see specific information pulled into the AVS if appropriate.     I spent 30 minutes on this date of service. This time includes time spent by me in the following activities:preparing for the visit, counseling and educating the patient/family/caregiver, ordering medications, tests, or procedures and documenting information in the medical record.    Shelbi Pereira, APRN  12/19/2023

## 2023-12-19 ENCOUNTER — OFFICE VISIT (OUTPATIENT)
Dept: BARIATRICS/WEIGHT MGMT | Facility: CLINIC | Age: 53
End: 2023-12-19
Payer: COMMERCIAL

## 2023-12-19 VITALS
HEART RATE: 80 BPM | SYSTOLIC BLOOD PRESSURE: 126 MMHG | HEIGHT: 65 IN | WEIGHT: 173.6 LBS | BODY MASS INDEX: 28.92 KG/M2 | RESPIRATION RATE: 16 BRPM | OXYGEN SATURATION: 99 % | DIASTOLIC BLOOD PRESSURE: 84 MMHG

## 2023-12-19 DIAGNOSIS — E66.3 OVERWEIGHT (BMI 25.0-29.9): Primary | ICD-10-CM

## 2023-12-19 RX ORDER — PHENTERMINE AND TOPIRAMATE 11.25; 69 MG/1; MG/1
11.25-69 CAPSULE, EXTENDED RELEASE ORAL DAILY
Qty: 30 CAPSULE | Refills: 0 | Status: SHIPPED | OUTPATIENT
Start: 2023-12-19 | End: 2024-01-18

## 2023-12-19 NOTE — ASSESSMENT & PLAN NOTE
Patient's (Body mass index is 29.34 kg/m².) indicates that they are overweight with health conditions that include dyslipidemias, osteoarthritis, and elevated A1C  . Weight is improving with treatment. BMI is is above average; BMI management plan is completed. We discussed low calorie, low carb based diet program, portion control, increasing exercise, pharmacologic options including qsymia, and an mildred-based approach such as [employer] Pal or Lose It.     --This is a follow up visit and she is down around a pound  --Continue qsymia. May increase dose at next office visit  --Gaol to manage stress in life and not gain weight over the holidays.   --Goal to continue food journaling   --UDA due at next visit

## 2024-01-22 NOTE — PROGRESS NOTES
[facility]  [address]  [place] [address]     Office Note      Date: 2024  Patient Name: Marina Diaz  MRN: [id number]  : 1970    Subjective     Chief Complaint  Obesity Management follow-up    Marina Diaz presents to [facility] for obesity management.     Patient is satisfied with weight loss progress. Appetite is moderately controlled. Reports no side effects of prescribed medications today. The patient is taking multivitamin and is not taking fish oil.  The patient is using a food journal.  The patient rates current efforts as 5 out of 10. She is up around 2 lbs but is ready to reset.     The patient is exercising with a FITT score of:    Frequency Intensity Time Strength Training   []   0, none []   0 []   0 [x]   0   [x]   1 (1-2x/week) [x]   1 (light) []   1 (<10 min) []   1 (1x/week)   []   2 (3-5x/week) []   2 (moderate) []   2 (10-20 min) []   2 (2x/week)   []   3 (daily) []   3 (moderately hard)  []   4 (very hard) []   3 (20-30 min)  [x]   4 (>30 min) []   3 (3-4x/week)     Review of Systems   Constitutional:  Negative for appetite change and fatigue.   Eyes:  Negative for visual disturbance.   Cardiovascular:  Negative for chest pain and palpitations.   Gastrointestinal:  Negative for constipation and indigestion.   Neurological:  Negative for light-headedness.   All other systems reviewed and are negative.      Objective   Start weight: 215 pounds.    Total Loss lb/%Loss of beginning body weight (BBW): -38.8 lb/-18.05 %  Change in weight since last visit: +2.6    Recent Weight History:   Wt Readings from Last 6 Encounters:   24 79.9 kg (176 lb 3.2 oz)   23 78.7 kg (173 lb 9.6 oz)   23 79.2 kg (174 lb 9.6 oz)   10/24/23 81.2 kg (179 lb)   10/12/23 79.7 kg (175 lb 12.8 oz)   23 81.5 kg (179 lb 9.6 oz)     Body mass index is 29.78 kg/m².   Body composition analysis  "completed and showed:   Body Fat %: 37.7    Measurements (in inches)  Waist Circumference: 34    Vital Signs:   /86 (BP Location: Left arm, Patient Position: Sitting)   Pulse 86   Ht 163.8 cm (64.5\")   Wt 79.9 kg (176 lb 3.2 oz)   BMI 29.78 kg/m²       Physical Exam  Vitals reviewed.   Constitutional:       Appearance: She is well-developed.      Comments: overweight   Pulmonary:      Effort: Pulmonary effort is normal.   Neurological:      Mental Status: She is alert.   Psychiatric:         Attention and Perception: Attention normal.         Mood and Affect: Mood normal.         Speech: Speech normal.         Behavior: Behavior normal.        Result Review :                Assessment / Plan        Diagnoses and all orders for this visit:    1. Overweight (BMI 25.0-29.9) (Primary)  Assessment & Plan:  Patient's (Body mass index is 29.78 kg/m².) indicates that they are overweight with health conditions that include dyslipidemias . Weight is improving with treatment. BMI is is above average; BMI management plan is completed. We discussed low calorie, low carb based diet program, portion control, increasing exercise, pharmacologic options including phentermine, and an mildred-based approach such as [email or web address] Pal or Lose It.   --This is a follow up visit and she is up around two and a half pounds   --Continue qsymia.   --UDS today.   --Currently having quite a bit of stress with family illness. Currently focusing on mostly maintaining. She is currently down a total of 38 lb or 18% total.     Orders:  -     Urine Drug Screen - Urine, Clean Catch  -     Phentermine-Topiramate (Qsymia) 11.25-69 MG capsule sustained-release 24 hr; Take 11.25-69 mg by mouth Daily for 30 days.  Dispense: 30 capsule; Refill: 0    2. Encounter for long-term current use of medication  -     Urine Drug Screen - Urine, Clean Catch        We discussed the risks, benefits, and limitations of treatments. Continue medications and OTC supplements " as discussed. Patient verbalizes understanding of and agreement with management plan.     Follow Up   Return in about 4 weeks (around 2/20/2024).  Patient was given instructions and counseling regarding her condition or for health maintenance advice. Please see specific information pulled into the AVS if appropriate.      This time includes time spent by me in the following activities:preparing for the visit, counseling and educating the patient/family/caregiver, ordering medications, tests, or procedures and documenting information in the medical record.    Shelbi Pereira, APRN  01/23/2024

## 2024-01-23 ENCOUNTER — OFFICE VISIT (OUTPATIENT)
Dept: BARIATRICS/WEIGHT MGMT | Facility: CLINIC | Age: 54
End: 2024-01-23
Payer: COMMERCIAL

## 2024-01-23 VITALS
BODY MASS INDEX: 29.36 KG/M2 | HEART RATE: 86 BPM | WEIGHT: 176.2 LBS | HEIGHT: 65 IN | SYSTOLIC BLOOD PRESSURE: 134 MMHG | DIASTOLIC BLOOD PRESSURE: 86 MMHG

## 2024-01-23 DIAGNOSIS — E66.3 OVERWEIGHT (BMI 25.0-29.9): Primary | ICD-10-CM

## 2024-01-23 DIAGNOSIS — Z79.899 ENCOUNTER FOR LONG-TERM CURRENT USE OF MEDICATION: ICD-10-CM

## 2024-01-23 RX ORDER — PHENTERMINE AND TOPIRAMATE 11.25; 69 MG/1; MG/1
11.25-69 CAPSULE, EXTENDED RELEASE ORAL DAILY
Qty: 30 CAPSULE | Refills: 0 | Status: SHIPPED | OUTPATIENT
Start: 2024-01-23 | End: 2024-02-22

## 2024-01-23 NOTE — ASSESSMENT & PLAN NOTE
Patient's (Body mass index is 29.78 kg/m².) indicates that they are overweight with health conditions that include dyslipidemias . Weight is improving with treatment. BMI is is above average; BMI management plan is completed. We discussed low calorie, low carb based diet program, portion control, increasing exercise, pharmacologic options including phentermine, and an mildred-based approach such as [employer] Pal or Lose It.   --This is a follow up visit and she is up around two and a half pounds   --Continue qsymia.   --UDS today.   --Currently having quite a bit of stress with family illness. Currently focusing on mostly maintaining. She is currently down a total of 38 lb or 18% total.   --Addendum 1/19/2024: staff check for medication show wegovy/Saxenda covered for new insurance plan

## 2024-01-24 LAB
AMPHETAMINES UR QL SCN: NEGATIVE NG/ML
BARBITURATES UR QL SCN: NEGATIVE NG/ML
BENZODIAZ UR QL SCN: NEGATIVE NG/ML
BZE UR QL SCN: NEGATIVE NG/ML
CANNABINOIDS UR QL SCN: NEGATIVE NG/ML
CREAT UR-MCNC: 147 MG/DL (ref 20–300)
LABORATORY COMMENT REPORT: NORMAL
METHADONE UR QL SCN: NEGATIVE NG/ML
OPIATES UR QL SCN: NEGATIVE NG/ML
OXYCODONE+OXYMORPHONE UR QL SCN: NEGATIVE NG/ML
PCP UR QL: NEGATIVE NG/ML
PH UR: 6.6 [PH] (ref 4.5–8.9)
PROPOXYPH UR QL SCN: NEGATIVE NG/ML

## 2024-02-15 NOTE — PROGRESS NOTES
"  [facility]  [address]  [place] [address]     Office Note      Date: 2024  Patient Name: Marina Diaz  MRN: [id number]  : 1970    --on qsymia, increase to 15?     Subjective     Chief Complaint  Obesity Management follow-up    Marina Diaz presents to [facility] for obesity management.  {Problem List  Visit Diagnosis   Encounters  Notes  Medications  Labs  Result Review Imaging  Media :23}   Patient is {Satisfied/unsatisfied/unsure:33082} with weight loss progress. Appetite is {poor/mod/well:50485}. Reports no side effects of prescribed medications today. The patient {is / IS NOT:71911::\"is not\"} taking multivitamin and {is / IS NOT:97239::\"is not\"} taking fish oil.  The patient {is / IS NOT:50490::\"is not\"} using a food journal.  The patient rates current efforts as *** out of 10.  The patient is exercising with a FITT score of:    Frequency Intensity Time Strength Training   []   0, none []   0 []   0 []   0   []   1 (1-2x/week) []   1 (light) []   1 (<10 min) []   1 (1x/week)   []   2 (3-5x/week) []   2 (moderate) []   2 (10-20 min) []   2 (2x/week)   []   3 (daily) []   3 (moderately hard)  []   4 (very hard) []   3 (20-30 min)  []   4 (>30 min) []   3 (3-4x/week)     Review of Systems    Objective   Start weight: *** pounds.    Total Loss lb/%Loss of beginning body weight (BBW): ***lb/***%  Change in weight since last visit: ***    Recent Weight History:   Wt Readings from Last 6 Encounters:   24 79.9 kg (176 lb 3.2 oz)   23 78.7 kg (173 lb 9.6 oz)   23 79.2 kg (174 lb 9.6 oz)   10/24/23 81.2 kg (179 lb)   10/12/23 79.7 kg (175 lb 12.8 oz)   23 81.5 kg (179 lb 9.6 oz)         There is no height or weight on file to calculate BMI.   Body composition analysis completed and showed:        Measurements (in inches)       Vital Signs:   There were no vitals taken for " this visit.      Physical Exam   Result Review :{Labs  Result Review  Imaging  Med Tab  Media :23}   {The following data was reviewed by (Optional):79193}  {Ambulatory Labs (Optional):42054}           Assessment / Plan     {CC Problem List  Visit Diagnosis  ROS  Review (Popup)  Health Maintenance  Quality  BestPractice  Medications  SmartSets  SnapShot Encounters  Media :23}   There are no diagnoses linked to this encounter.    We discussed the risks, benefits, and limitations of treatments. Continue medications and OTC supplements as discussed. Patient verbalizes understanding of and agreement with management plan.     Follow Up {Instructions Charge Capture  Follow-up Communications :23}  No follow-ups on file.  Patient was given instructions and counseling regarding her condition or for health maintenance advice. Please see specific information pulled into the AVS if appropriate.     I spent *** minutes on this date of service. This time includes time spent by me in the following activities:preparing for the visit, counseling and educating the patient/family/caregiver, ordering medications, tests, or procedures and documenting information in the medical record.    Shelbi Pereira, APRN  02/19/2024

## 2024-02-19 ENCOUNTER — OFFICE VISIT (OUTPATIENT)
Dept: BARIATRICS/WEIGHT MGMT | Facility: CLINIC | Age: 54
End: 2024-02-19
Payer: COMMERCIAL

## 2024-02-19 VITALS
HEART RATE: 80 BPM | SYSTOLIC BLOOD PRESSURE: 130 MMHG | HEIGHT: 65 IN | WEIGHT: 175 LBS | BODY MASS INDEX: 29.16 KG/M2 | DIASTOLIC BLOOD PRESSURE: 84 MMHG

## 2024-02-19 DIAGNOSIS — E66.3 OVERWEIGHT (BMI 25.0-29.9): Primary | ICD-10-CM

## 2024-02-19 PROCEDURE — 99215 OFFICE O/P EST HI 40 MIN: CPT | Performed by: NURSE PRACTITIONER

## 2024-02-19 RX ORDER — PHENTERMINE AND TOPIRAMATE 11.25; 69 MG/1; MG/1
11.25-69 CAPSULE, EXTENDED RELEASE ORAL DAILY
Qty: 30 CAPSULE | Refills: 0 | Status: SHIPPED | OUTPATIENT
Start: 2024-02-19 | End: 2024-03-20

## 2024-02-19 NOTE — PROGRESS NOTES
[facility]  [address]  [place] [address]     Office Note      Date: 2024  Patient Name: Marina Diaz  MRN: [id number]  : 1970    Subjective     Chief Complaint  Obesity Management follow-up    Marina Diaz presents to [facility] for obesity management.       Patient is satisfied with weight loss progress. Appetite is moderately controlled. Reports no side effects of prescribed medications today. The patient is taking multivitamin and is not taking fish oil.  The patient is using a food journal, around 3 day week.  The patient rates current efforts as 6 out of 10. Reports that her exercise has decreased and expect to increase with warmer weather. She is working on increasing her vegetable intake.     The patient is exercising with a FITT score of:    Frequency Intensity Time Strength Training   [x]   0, none [x]   0 [x]   0 [x]   0   []   1 (1-2x/week) []   1 (light) []   1 (<10 min) []   1 (1x/week)   []   2 (3-5x/week) []   2 (moderate) []   2 (10-20 min) []   2 (2x/week)   []   3 (daily) []   3 (moderately hard)  []   4 (very hard) []   3 (20-30 min)  []   4 (>30 min) []   3 (3-4x/week)     Review of Systems   Constitutional:  Negative for appetite change and fatigue.   Eyes:  Negative for visual disturbance.   Cardiovascular:  Negative for chest pain and palpitations.   Gastrointestinal:  Negative for constipation and indigestion.   Neurological:  Negative for light-headedness.   All other systems reviewed and are negative.    Objective   Start weight: 215 pounds.    Total Loss lb/%Loss of beginning body weight (BBW): -40 lb/-18.60 %  Change in weight since last visit: -1.2    Recent Weight History:   Wt Readings from Last 6 Encounters:   24 79.4 kg (175 lb)   24 79.9 kg (176 lb 3.2 oz)   23 78.7 kg (173 lb 9.6 oz)   23 79.2 kg (174 lb 9.6 oz)   10/24/23 81.2 kg (179 lb)  "  10/12/23 79.7 kg (175 lb 12.8 oz)     Body mass index is 29.57 kg/m².   Body composition analysis completed and showed:   Body Fat %: 42.2    Measurements (in inches)  Waist Circumference: 35.8    Vital Signs:   /84 (BP Location: Left arm, Patient Position: Sitting)   Pulse 80   Ht 163.8 cm (64.5\")   Wt 79.4 kg (175 lb)   BMI 29.57 kg/m²       Physical Exam  Vitals reviewed.   Constitutional:       Appearance: She is well-developed.      Comments: overweight   Pulmonary:      Effort: Pulmonary effort is normal.   Neurological:      Mental Status: She is alert.   Psychiatric:         Attention and Perception: Attention normal.         Mood and Affect: Mood normal.         Speech: Speech normal.         Behavior: Behavior normal.      Result Review :     Common labs          9/25/2023    09:36   Common Labs   Glucose 69    BUN 10    Creatinine 0.96    Sodium 137    Potassium 3.8    Chloride 104    Calcium 9.3    Albumin 4.1    Total Bilirubin 0.4    Alkaline Phosphatase 54    AST (SGOT) 26    ALT (SGPT) 15    WBC 5.06    Hemoglobin 13.0    Hematocrit 38.5    Platelets 251    Total Cholesterol 222    Triglycerides 119    HDL Cholesterol 55    LDL Cholesterol  146    Hemoglobin A1C 5.40       Assessment / Plan        Diagnoses and all orders for this visit:    1. Overweight (BMI 25.0-29.9) (Primary)  Assessment & Plan:  Patient's (Body mass index is 29.57 kg/m².) indicates that they are overweight with health conditions that include dyslipidemias and osteoarthritis . Weight is improving with treatment. BMI is is above average; BMI management plan is completed. We discussed low calorie, low carb based diet program, portion control, increasing exercise, pharmacologic options including qsymia, and an mildred-based approach such as [employer] Pal or Lose It.   -- This is a follow-up visit and patient is down around 1.2 pounds since last being seen approximately a month ago.  --Continue Qsymia, refill sent in.  Morales " reviewed and UDS reviewed.  --Staff message sent to check on injectable medication coverage.  --She did restart food journaling this month and on average is getting about 3 days a week.  Will continue to be her #1 goal and asked her to bring in next office visit for brief review.  --In general she is down around 40 pounds total around 18% of her original start weight of around 215  --She is working in increasing vegetables, keep up great effort.     Orders:  -     Phentermine-Topiramate (Qsymia) 11.25-69 MG capsule sustained-release 24 hr; Take 11.25-69 mg by mouth Daily for 30 days.  Dispense: 30 capsule; Refill: 0    We discussed the risks, benefits, and limitations of treatments. Continue medications and OTC supplements as discussed. Patient verbalizes understanding of and agreement with management plan.     Follow Up   Return in about 4 weeks (around 3/18/2024).  Patient was given instructions and counseling regarding her condition or for health maintenance advice. Please see specific information pulled into the AVS if appropriate.     I spent 40 minutes on this date of service. This time includes time spent by me in the following activities:preparing for the visit, counseling and educating the patient/family/caregiver, ordering medications, tests, or procedures and documenting information in the medical record.    Shelbi Pereira, APRN  02/19/2024

## 2024-02-19 NOTE — ADDENDUM NOTE
Addended by: CHIKI CAMPBELL on: 2/19/2024 06:28 PM     Modules accepted: Level of Service     Goal Outcome Evaluation: Patient went for EEG today alert to self and and place up in chair for breakfast and is eating 100% for meals. Patient needs walker and gait belt for ambulation patient not steady and running into walls and furniture with walker today. Will continue to monitor

## 2024-02-19 NOTE — ASSESSMENT & PLAN NOTE
Patient's (Body mass index is 29.57 kg/m².) indicates that they are overweight with health conditions that include dyslipidemias and osteoarthritis . Weight is improving with treatment. BMI is is above average; BMI management plan is completed. We discussed low calorie, low carb based diet program, portion control, increasing exercise, pharmacologic options including qsymia, and an mildred-based approach such as [employer] Pal or Lose It.   -- This is a follow-up visit and patient is down around 1.2 pounds since last being seen approximately a month ago.  --Continue Qsymia, refill sent in.  Morales reviewed and UDS reviewed.  --Staff message sent to check on injectable medication coverage; Wegovy and Saxenda approved.   --She did restart food journaling this month and on average is getting about 3 days a week.  Will continue to be her #1 goal and asked her to bring in next office visit for brief review.  --In general she is down around 40 pounds total around 18% of her original start weight of around 215  --She is working in increasing vegetables, keep up great effort.

## 2024-03-17 NOTE — PROGRESS NOTES
[facility]  [address]  [place] [address]     Office Note      Date: 2024  Patient Name: Marina Diaz  MRN: [id number]  : 1970    Subjective     Chief Complaint  Obesity Management follow-up    Marina Diaz presents to [facility] for obesity management.        Patient is satisfied with weight loss progress. Appetite is moderately controlled. Reports no side effects of prescribed medications today. The patient is taking multivitamin and is not taking fish oil.  The patient is using a food journal.  The patient rates current efforts as 5 out of 10. She is feeling confident about her progress at this time and states to be maintaining. She did voice her future goal to attempt weaning off of the medication and potential to stop program to attempt lifestyle modifications only. She would like to think about this over the next month as she is feeling overwhelmed with her outside responsibilities involving her family. She feels she has learned a lot but feels she is not fully invested into the program given her outside responsibilities.  She states to not be eating much due to lack of hunger and when she does eat she is making poor choices, not getting enough protein and fiber. She is journaling most days.     The patient is exercising with a FITT score of:    Frequency Intensity Time Strength Training   []   0, none []   0 []   0 [x]   0   [x]   1 (1-2x/week) [x]   1 (light) []   1 (<10 min) []   1 (1x/week)   []   2 (3-5x/week) []   2 (moderate) []   2 (10-20 min) []   2 (2x/week)   []   3 (daily) []   3 (moderately hard)  []   4 (very hard) []   3 (20-30 min)  [x]   4 (>30 min) []   3 (3-4x/week)     Review of Systems   Constitutional:  Negative for appetite change and fatigue.   Eyes:  Negative for visual disturbance.   Cardiovascular:  Negative for chest pain and palpitations.   Gastrointestinal:   "Negative for constipation and indigestion.   Neurological:  Negative for light-headedness.       Objective   Start weight: 215 pounds.    Total Loss lb/%Loss of beginning body weight (BBW): -42lb/-19.52%  Change in weight since last visit: -1.8    Recent Weight History:   Wt Readings from Last 6 Encounters:   03/19/24 78.6 kg (173 lb 3.2 oz)   02/19/24 79.4 kg (175 lb)   01/23/24 79.9 kg (176 lb 3.2 oz)   12/19/23 78.7 kg (173 lb 9.6 oz)   11/13/23 79.2 kg (174 lb 9.6 oz)   10/24/23 81.2 kg (179 lb)     Body mass index is 29.27 kg/m².   Body composition analysis completed and showed:   Body Fat %: 40.4    Measurements (in inches)  Waist Circumference: 36    Vital Signs:   /78 (BP Location: Left arm, Patient Position: Sitting)   Pulse 72   Ht 163.8 cm (64.5\")   Wt 78.6 kg (173 lb 3.2 oz)   SpO2 97%   BMI 29.27 kg/m²       Physical Exam  Vitals reviewed.   Constitutional:       Appearance: She is well-developed.      Comments: overweight   Pulmonary:      Effort: Pulmonary effort is normal.   Neurological:      Mental Status: She is alert.   Psychiatric:         Attention and Perception: Attention normal.         Mood and Affect: Mood normal.         Speech: Speech normal.         Behavior: Behavior normal.        Result Review :     Common labs          9/25/2023    09:36   Common Labs   Glucose 69    BUN 10    Creatinine 0.96    Sodium 137    Potassium 3.8    Chloride 104    Calcium 9.3    Albumin 4.1    Total Bilirubin 0.4    Alkaline Phosphatase 54    AST (SGOT) 26    ALT (SGPT) 15    WBC 5.06    Hemoglobin 13.0    Hematocrit 38.5    Platelets 251    Total Cholesterol 222    Triglycerides 119    HDL Cholesterol 55    LDL Cholesterol  146    Hemoglobin A1C 5.40               Assessment / Plan        Diagnoses and all orders for this visit:    1. Overweight (BMI 25.0-29.9) (Primary)  Assessment & Plan:  Patient's (Body mass index is 29.27 kg/m².) indicates that they are overweight with health conditions " that include dyslipidemias and elevated A1C, low vitamin D  . Weight is improving with treatment. BMI is is above average; BMI management plan is completed. We discussed low calorie, low carb based diet program, portion control, increasing exercise, pharmacologic options including Qsymia, and an mildred-based approach such as [employer] Pal or Lose It.     --This is a follow up visit and patient is down around 1.8 lbs over the last month with total weight loss of 42 lbs (19% total body weight loss)  --On Qsymia 11.25/69, tolerating well, vitals stable. Continue current plan and dose. Refill sent. Morales and ELYSSA reviewed.  --She is considering tapering off medication to move to lifestyle modifications only, will taper to lower dosing at next visit with plan to discontinue at patient discretion if indicated  --GLP-1 injections excluded from her current plan  --Continue to journal daily with a focus to increase protein and fiber intake  --She has been getting some outdoor walks in given improving weather. Encouraged to continue this effort.    Orders:  -     Phentermine-Topiramate (Qsymia) 11.25-69 MG capsule sustained-release 24 hr; Take 11.25-69 mg by mouth Daily for 30 days.  Dispense: 30 capsule; Refill: 0        We discussed the risks, benefits, and limitations of treatments. Continue medications and OTC supplements as discussed. Patient verbalizes understanding of and agreement with management plan.     Follow Up   Return in about 4 weeks (around 4/16/2024).  Patient was given instructions and counseling regarding her condition or for health maintenance advice. Please see specific information pulled into the AVS if appropriate.     I spent 40 minutes on this date of service. This time includes time spent by me in the following activities:preparing for the visit, counseling and educating the patient/family/caregiver, ordering medications, tests, or procedures and documenting information in the medical  record.    Shelbi Pereira, APRN  03/19/2024

## 2024-03-18 ENCOUNTER — PRIOR AUTHORIZATION (OUTPATIENT)
Dept: BARIATRICS/WEIGHT MGMT | Facility: CLINIC | Age: 54
End: 2024-03-18
Payer: COMMERCIAL

## 2024-03-18 NOTE — TELEPHONE ENCOUNTER
Marina Diaz (James: [id number])  Wegovy 0.25MG/0.5ML auto-injectors    Message from Plan  This request cannot be processed due to the medication is not covered by the plan.

## 2024-03-19 ENCOUNTER — OFFICE VISIT (OUTPATIENT)
Dept: BARIATRICS/WEIGHT MGMT | Facility: CLINIC | Age: 54
End: 2024-03-19
Payer: COMMERCIAL

## 2024-03-19 VITALS
HEIGHT: 65 IN | OXYGEN SATURATION: 97 % | SYSTOLIC BLOOD PRESSURE: 122 MMHG | HEART RATE: 72 BPM | DIASTOLIC BLOOD PRESSURE: 78 MMHG | BODY MASS INDEX: 28.86 KG/M2 | WEIGHT: 173.2 LBS

## 2024-03-19 DIAGNOSIS — E66.3 OVERWEIGHT (BMI 25.0-29.9): Primary | ICD-10-CM

## 2024-03-19 PROCEDURE — 99215 OFFICE O/P EST HI 40 MIN: CPT | Performed by: NURSE PRACTITIONER

## 2024-03-19 RX ORDER — PHENTERMINE AND TOPIRAMATE 11.25; 69 MG/1; MG/1
11.25-69 CAPSULE, EXTENDED RELEASE ORAL DAILY
Qty: 30 CAPSULE | Refills: 0 | Status: SHIPPED | OUTPATIENT
Start: 2024-03-19 | End: 2024-04-18

## 2024-03-19 NOTE — ASSESSMENT & PLAN NOTE
Patient's (Body mass index is 29.27 kg/m².) indicates that they are overweight with health conditions that include dyslipidemias and elevated A1C, low vitamin D  . Weight is improving with treatment. BMI is is above average; BMI management plan is completed. We discussed low calorie, low carb based diet program, portion control, increasing exercise, pharmacologic options including Qsymia, and an mildred-based approach such as [employer] Pal or Lose It.     --This is a follow up visit and patient is down around 1.8 lbs over the last month with total weight loss of 42 lbs (19% total body weight loss)  --On Qsymia 11.25/69, tolerating well, vitals stable. Continue current plan and dose. Refill sent. Morales and ELYSSA reviewed.  --She is considering tapering off medication to move to lifestyle modifications only, will taper to lower dosing at next visit with plan to discontinue at patient discretion if indicated  --GLP-1 injections excluded from her current plan  --Continue to journal daily with a focus to increase protein and fiber intake  --She has been getting some outdoor walks in given improving weather. Encouraged to continue this effort.

## 2024-04-11 NOTE — PROGRESS NOTES
[facility]  [address]  [place] [address]     Office Note      Date: 04/15/2024  Patient Name: Marina Diaz  MRN: [id number]  : 1970    Subjective     Chief Complaint  Obesity Management follow-up    Marina Diaz presents to [facility] for obesity management.     Patient is unsure with weight loss progress. Appetite is moderately controlled. On Qsymia . Reports no side effects of prescribed medications today. The patient is taking multivitamin and is not taking fish oil.  The patient is not using a food journal.  The patient rates current efforts as 5 out of 10. She does appreciate having a bad month regarding nutritional intake. Also she states she has been celebrating her birthday and is not surprised she has gained weight.     The patient is exercising with a FITT score of:    Frequency Intensity Time Strength Training   [x]   0, none [x]   0 [x]   0 [x]   0   []   1 (1-2x/week) []   1 (light) []   1 (<10 min) []   1 (1x/week)   []   2 (3-5x/week) []   2 (moderate) []   2 (10-20 min) []   2 (2x/week)   []   3 (daily) []   3 (moderately hard)  []   4 (very hard) []   3 (20-30 min)  []   4 (>30 min) []   3 (3-4x/week)     Review of Systems   Constitutional:  Negative for appetite change and fatigue.   Eyes:  Negative for visual disturbance.   Cardiovascular:  Negative for chest pain and palpitations.   Gastrointestinal:  Negative for constipation and indigestion.   Neurological:  Negative for light-headedness.   All other systems reviewed and are negative.    Objective   Start weight: 215.2 pounds.    Total Loss lb/%Loss of beginning body weight (BBW): -36.8 lb/-17.10 %  Change in weight since last visit: +5.2    Recent Weight History:   Wt Readings from Last 6 Encounters:   04/15/24 80.9 kg (178 lb 6.4 oz)   24 78.6 kg (173 lb 3.2 oz)   24 79.4 kg (175 lb)   24 79.9 kg (176 lb  "3.2 oz)   12/19/23 78.7 kg (173 lb 9.6 oz)   11/13/23 79.2 kg (174 lb 9.6 oz)       Body mass index is 30.15 kg/m².   Body composition analysis completed and showed:   Body Fat %: 28.1    Measurements (in inches)  Waist Circumference: 37.5    Vital Signs:   /80 (BP Location: Left arm, Patient Position: Sitting)   Pulse 74   Ht 163.8 cm (64.5\")   Wt 80.9 kg (178 lb 6.4 oz)   BMI 30.15 kg/m²       Physical Exam  Vitals reviewed.   Constitutional:       Appearance: She is obese. She is not ill-appearing.   Pulmonary:      Effort: Pulmonary effort is normal.   Neurological:      Mental Status: She is alert.   Psychiatric:         Attention and Perception: Attention and perception normal.         Behavior: Behavior is cooperative.        Result Review :     Common labs          9/25/2023    09:36   Common Labs   Glucose 69    BUN 10    Creatinine 0.96    Sodium 137    Potassium 3.8    Chloride 104    Calcium 9.3    Albumin 4.1    Total Bilirubin 0.4    Alkaline Phosphatase 54    AST (SGOT) 26    ALT (SGPT) 15    WBC 5.06    Hemoglobin 13.0    Hematocrit 38.5    Platelets 251    Total Cholesterol 222    Triglycerides 119    HDL Cholesterol 55    LDL Cholesterol  146    Hemoglobin A1C 5.40               Assessment / Plan        Diagnoses and all orders for this visit:    1. Obesity (BMI 30-39.9) (Primary)  Assessment & Plan:  Patient's (Body mass index is 30.15 kg/m².) indicates that they are obese (BMI >30) with health conditions that include dyslipidemias, osteoarthritis, and elevated A1C, vitamin D deficiency  . Weight is worsening. BMI  is above average; BMI management plan is completed. We discussed portion control, increasing exercise, and pharmacologic options including Qsymia .     --This is a follow up visit and the patient is up about 5.2 lbs.   --On Qsymia 11.25/69. UDS January, will pull paper Morales.  --Patient refocusing on food choices as she appreciates bad month. Encouraged this effort. "     Scribed for LEOPOLDO Gonzales by LEOPOLDO Duncan. 4/15/2024  08:29 EDT      Orders:  -     Phentermine-Topiramate (Qsymia) 11.25-69 MG capsule sustained-release 24 hr; Take 11.25-69 mg by mouth Daily for 30 days.  Dispense: 30 capsule; Refill: 0        We discussed the risks, benefits, and limitations of treatments. Continue medications and OTC supplements as discussed. Patient verbalizes understanding of and agreement with management plan.     Follow Up   Return in about 4 weeks (around 5/13/2024).  Patient was given instructions and counseling regarding her condition or for health maintenance advice. Please see specific information pulled into the AVS if appropriate.     I spent 30 minutes on this date of service. This time includes time spent by me in the following activities:preparing for the visit, counseling and educating the patient/family/caregiver, ordering medications, tests, or procedures and documenting information in the medical record.    LEOPOLDO Gonzales  04/15/2024

## 2024-04-15 ENCOUNTER — OFFICE VISIT (OUTPATIENT)
Dept: BARIATRICS/WEIGHT MGMT | Facility: CLINIC | Age: 54
End: 2024-04-15
Payer: COMMERCIAL

## 2024-04-15 VITALS
WEIGHT: 178.4 LBS | HEART RATE: 74 BPM | BODY MASS INDEX: 29.72 KG/M2 | SYSTOLIC BLOOD PRESSURE: 122 MMHG | DIASTOLIC BLOOD PRESSURE: 80 MMHG | HEIGHT: 65 IN

## 2024-04-15 DIAGNOSIS — E66.9 OBESITY (BMI 30-39.9): Primary | ICD-10-CM

## 2024-04-15 PROCEDURE — 99214 OFFICE O/P EST MOD 30 MIN: CPT | Performed by: NURSE PRACTITIONER

## 2024-04-15 RX ORDER — PHENTERMINE AND TOPIRAMATE 7.5; 46 MG/1; MG/1
1 CAPSULE, EXTENDED RELEASE ORAL DAILY
Qty: 30 CAPSULE | Refills: 0 | Status: CANCELLED | OUTPATIENT
Start: 2024-04-15 | End: 2024-05-15

## 2024-04-15 RX ORDER — PHENTERMINE AND TOPIRAMATE 11.25; 69 MG/1; MG/1
11.25-69 CAPSULE, EXTENDED RELEASE ORAL DAILY
Qty: 30 CAPSULE | Refills: 0 | Status: SHIPPED | OUTPATIENT
Start: 2024-04-15 | End: 2024-05-15

## 2024-04-15 NOTE — ASSESSMENT & PLAN NOTE
Patient's (Body mass index is 30.15 kg/m².) indicates that they are obese (BMI >30) with health conditions that include dyslipidemias, osteoarthritis, and elevated A1C, vitamin D deficiency  . Weight is worsening. BMI  is above average; BMI management plan is completed. We discussed portion control, increasing exercise, and pharmacologic options including Qsymia .     --This is a follow up visit and the patient is up about 5.2 lbs.   --On Qsymia 11.25/69. UDS January, will pull paper Moraels.   --Patient refocusing on food choices as she appreciates bad month. Encouraged this effort.     Scribed for LEOPOLDO Gonzales by LEOPOLDO Duncan. 4/15/2024  08:29 EDT

## 2024-05-16 RX ORDER — PHENTERMINE AND TOPIRAMATE 11.25; 69 MG/1; MG/1
11.25-69 CAPSULE, EXTENDED RELEASE ORAL DAILY
Qty: 30 CAPSULE | Refills: 0 | Status: CANCELLED | OUTPATIENT
Start: 2024-05-16 | End: 2024-06-15

## 2024-05-20 ENCOUNTER — OFFICE VISIT (OUTPATIENT)
Dept: BARIATRICS/WEIGHT MGMT | Facility: CLINIC | Age: 54
End: 2024-05-20
Payer: COMMERCIAL

## 2024-05-20 VITALS
WEIGHT: 181.6 LBS | HEART RATE: 82 BPM | BODY MASS INDEX: 30.26 KG/M2 | DIASTOLIC BLOOD PRESSURE: 78 MMHG | HEIGHT: 65 IN | SYSTOLIC BLOOD PRESSURE: 124 MMHG

## 2024-05-20 DIAGNOSIS — R73.09 ELEVATED HEMOGLOBIN A1C: ICD-10-CM

## 2024-05-20 DIAGNOSIS — E66.9 OBESITY (BMI 30-39.9): Primary | ICD-10-CM

## 2024-05-20 DIAGNOSIS — E55.9 VITAMIN D DEFICIENCY: ICD-10-CM

## 2024-05-20 PROCEDURE — 99214 OFFICE O/P EST MOD 30 MIN: CPT | Performed by: NURSE PRACTITIONER

## 2024-05-20 RX ORDER — PHENTERMINE AND TOPIRAMATE 15; 92 MG/1; MG/1
15-92 CAPSULE, EXTENDED RELEASE ORAL DAILY
Qty: 30 CAPSULE | Refills: 1 | Status: SHIPPED | OUTPATIENT
Start: 2024-05-20 | End: 2024-06-19

## 2024-05-20 NOTE — PROGRESS NOTES
[facility]  [address]  [place] [address]     Office Note      Date: 2024  Patient Name: Marina Diaz  MRN: [id number]  : 1970    Subjective     Chief Complaint  Obesity Management follow-up    Marina Diaz presents to [facility] for obesity management.     Patient is unsatisfied with weight loss progress. Appetite is moderately controlled. Reports no side effects of prescribed medications today. The patient is taking multivitamin and is not taking fish oil.  The patient is using a food journal.  Reports that she is eating one big meal a day. Reports that her carbs have increased.     The patient is exercising with a FITT score of:    Frequency Intensity Time Strength Training   []   0, none []   0 []   0 [x]   0   [x]   1 (1-2x/week) [x]   1 (light) []   1 (<10 min) []   1 (1x/week)   []   2 (3-5x/week) []   2 (moderate) []   2 (10-20 min) []   2 (2x/week)   []   3 (daily) []   3 (moderately hard)  []   4 (very hard) []   3 (20-30 min)  [x]   4 (>30 min) []   3 (3-4x/week)     Review of Systems   Constitutional:  Negative for appetite change and fatigue.   Eyes:  Negative for visual disturbance.   Cardiovascular:  Negative for chest pain and palpitations.   Gastrointestinal:  Negative for constipation and indigestion.   Neurological:  Negative for light-headedness.   All other systems reviewed and are negative.      Objective   Start weight: 215.2 pounds.    Total Loss lb/%Loss of beginning body weight (BBW): -33.6 lb/15.61 %  Change in weight since last visit: +3.6    Recent Weight History:   Wt Readings from Last 6 Encounters:   24 82.4 kg (181 lb 9.6 oz)   04/15/24 80.9 kg (178 lb 6.4 oz)   24 78.6 kg (173 lb 3.2 oz)   24 79.4 kg (175 lb)   24 79.9 kg (176 lb 3.2 oz)   23 78.7 kg (173 lb 9.6 oz)         Body mass index is 30.69 kg/m².   Body composition analysis  "completed and showed:        Measurements (in inches)       Vital Signs:   /78 (BP Location: Left arm, Patient Position: Sitting)   Pulse 82   Ht 163.8 cm (64.5\")   Wt 82.4 kg (181 lb 9.6 oz)   BMI 30.69 kg/m²       Physical Exam  Vitals reviewed.   Constitutional:       Appearance: She is obese. She is not ill-appearing.   Pulmonary:      Effort: Pulmonary effort is normal.   Neurological:      Mental Status: She is alert.   Psychiatric:         Attention and Perception: Attention and perception normal.         Behavior: Behavior is cooperative.        Result Review :     Common labs          9/25/2023    09:36   Common Labs   Glucose 69    BUN 10    Creatinine 0.96    Sodium 137    Potassium 3.8    Chloride 104    Calcium 9.3    Albumin 4.1    Total Bilirubin 0.4    Alkaline Phosphatase 54    AST (SGOT) 26    ALT (SGPT) 15    WBC 5.06    Hemoglobin 13.0    Hematocrit 38.5    Platelets 251    Total Cholesterol 222    Triglycerides 119    HDL Cholesterol 55    LDL Cholesterol  146    Hemoglobin A1C 5.40               Assessment / Plan        Diagnoses and all orders for this visit:    1. Obesity (BMI 30-39.9) (Primary)  Assessment & Plan:  Patient's (Body mass index is 30.69 kg/m².) indicates that they are obese (BMI >30) with health conditions that include dyslipidemias and elevated A1C and low Vit D  . Weight is improving with treatment. BMI  is above average; BMI management plan is completed. We discussed low calorie, low carb based diet program, portion control, increasing exercise, pharmacologic options including qsymia, and an mildred-based approach such as myeasydocsness Pal or Lose It.   --This is a follow up and she is up around 3.6 pounds.  --She has restarted food journaling, keep up this great effort goal is to continue this entire month.  Really focusing on having 3 meals a day and cutting out any sugary or extra carbs throughout the day.  Another focus is going to be to monitor her fiber is staying.  " Currently taking some psyllium husk fiber and consider adding additional fiber supplementation such as Rosibel fiber.  --Increase Qsymia to 15 mg, refill sent in.  --A1c and vitamin D completed today.    Orders:  -     Phentermine-Topiramate (Qsymia) 15-92 MG capsule sustained-release 24 hr; Take 15-92 mg by mouth Daily for 30 days.  Dispense: 30 capsule; Refill: 1    2. Vitamin D deficiency  -     Vitamin D,25-Hydroxy    3. Elevated hemoglobin A1c  -     Hemoglobin A1c        We discussed the risks, benefits, and limitations of treatments. Continue medications and OTC supplements as discussed. Patient verbalizes understanding of and agreement with management plan.     Follow Up   Return in about 4 weeks (around 6/17/2024).  Patient was given instructions and counseling regarding her condition or for health maintenance advice. Please see specific information pulled into the AVS if appropriate.       Shelbi Pereira, APRN  05/20/2024

## 2024-05-20 NOTE — ASSESSMENT & PLAN NOTE
Patient's (Body mass index is 30.69 kg/m².) indicates that they are obese (BMI >30) with health conditions that include dyslipidemias and elevated A1C and low Vit D  . Weight is improving with treatment. BMI  is above average; BMI management plan is completed. We discussed low calorie, low carb based diet program, portion control, increasing exercise, pharmacologic options including qsymia, and an mildred-based approach such as BiolineRx Pal or Lose It.   --This is a follow up and she is up around 3.6 pounds.  --She has restarted food journaling, keep up this great effort goal is to continue this entire month.  Really focusing on having 3 meals a day and cutting out any sugary or extra carbs throughout the day.  Another focus is going to be to monitor her fiber is staying.  Currently taking some psyllium husk fiber and consider adding additional fiber supplementation such as Rosibel fiber.  --Increase Qsymia to 15 mg, refill sent in.  --A1c and vitamin D completed today.

## 2024-05-21 LAB
25(OH)D3+25(OH)D2 SERPL-MCNC: 44.4 NG/ML (ref 30–100)
HBA1C MFR BLD: 5.3 % (ref 4.8–5.6)

## 2024-06-23 NOTE — PROGRESS NOTES
Office Note      Date: 2024  Patient Name: Marina Diaz  MRN: [id number]  : 1970    This service was conducted via [employer].  Patient is located in [place]  Provider is located at her office address. The use of a video visit has been reviewed with the patient and informed consent has been obtained.      Subjective     Chief Complaint  Obesity Management follow-up    Subjective          Marina Diaz presents to [facility] via telehealth for obesity management. Taking qsymia 15mg. She has increased grilling protein and vegetables. Reports her exercise  has increased this past month. Reports her home weight is 180 lbs. She does not have her BP or HR but will send that later today. Denies recent elevated BP readings.       Patient is satisfied with weight loss progress. Appetite is moderately controlled. Reports no side effects of prescribed medications today.     The patient is exercising with a FITT score of:    Frequency Intensity Time Strength Training   []   0, none []   0 []   0 []   0   []   1 (1-2x/week) []   1 (light) []   1 (<10 min) []   1 (1x/week)   [x]   2 (3-5x/week) [x]   2 (moderate) []   2 (10-20 min) []   2 (2x/week)   []   3 (daily) []   3 (moderately hard)  []   4 (very hard) []   3 (20-30 min)  [x]   4 (>30 min) []   3 (3-4x/week)       Review of Systems   Constitutional:  Negative for appetite change and fatigue.   Eyes:  Negative for visual disturbance.   Cardiovascular:  Negative for chest pain and palpitations.   Gastrointestinal:  Negative for constipation and indigestion.   Neurological:  Negative for light-headedness.       Objective   Body mass index is 30.42 kg/m².  Start weight: 215 pounds.    Total weight loss: -35 pounds/-16%  Change in weight since last visit: -1    Wt Readings from Last 6 Encounters:   24 1631 81.6 kg (180 lb)   24 0814 82.4 kg (181 lb 9.6 oz)   04/15/24 0812 80.9 kg (178 lb 6.4  "oz)   03/19/24 0810 78.6 kg (173 lb 3.2 oz)   02/19/24 0807 79.4 kg (175 lb)   01/23/24 1534 79.9 kg (176 lb 3.2 oz)     General:  .well developed; well nourished  no acute distress  obese        Ht 163.8 cm (64.5\")   Wt 81.6 kg (180 lb)   BMI 30.42 kg/m²       Result Review :     Common labs          9/25/2023    09:36 5/20/2024    08:59   Common Labs   Glucose 69     BUN 10     Creatinine 0.96     Sodium 137     Potassium 3.8     Chloride 104     Calcium 9.3     Albumin 4.1     Total Bilirubin 0.4     Alkaline Phosphatase 54     AST (SGOT) 26     ALT (SGPT) 15     WBC 5.06     Hemoglobin 13.0     Hematocrit 38.5     Platelets 251     Total Cholesterol 222     Triglycerides 119     HDL Cholesterol 55     LDL Cholesterol  146     Hemoglobin A1C 5.40  5.30                Assessment and Plan    Diagnoses and all orders for this visit:    1. Obesity (BMI 30-39.9) (Primary)  Assessment & Plan:  Patient's (Body mass index is 30.42 kg/m².) indicates that they are obese (BMI >30) with health conditions that include dyslipidemias . Weight is newly identified. BMI  is above average; BMI management plan is completed. We discussed low calorie, low carb based diet program, portion control, increasing exercise, pharmacologic options including qsymia, and an mildred-based approach such as [employer] Pal or Lose It.     --This is a follow up visit and she is down around one pound  --She has restarted exercise. Personal goal of at min 3 days per week  --Continue Qsymia 15mg. ELYSSA and ANABELA reviewed. Discussed adding possible compounded trizepatide  --Working on getting at min 2-3 meals per day. Keep up great   --Will provide BP and HR reading via message via phone.     Orders:  -     Phentermine-Topiramate (Qsymia) 15-92 MG capsule sustained-release 24 hr; Take 15-92 mg by mouth Daily for 30 days.  Dispense: 30 capsule; Refill: 0    2. Vitamin D insufficiency  Comments:  Labs reviewed. This has improved. Continue daily " multivitamins        We discussed the risks, benefits, and limitations of treatments. Continue medications and OTC supplements as discussed. Patient verbalizes understanding of and agreement with management plan.       Follow Up   Return in about 4 weeks (around 7/22/2024).    Patient was given instructions and counseling regarding her condition or for health maintenance advice. Please see specific information pulled into the AVS if appropriate.     LEOPOLDO Gonzales

## 2024-06-24 ENCOUNTER — TELEMEDICINE (OUTPATIENT)
Dept: BARIATRICS/WEIGHT MGMT | Facility: CLINIC | Age: 54
End: 2024-06-24
Payer: COMMERCIAL

## 2024-06-24 VITALS — HEIGHT: 65 IN | BODY MASS INDEX: 29.99 KG/M2 | WEIGHT: 180 LBS

## 2024-06-24 DIAGNOSIS — E66.9 OBESITY (BMI 30-39.9): Primary | ICD-10-CM

## 2024-06-24 DIAGNOSIS — E55.9 VITAMIN D INSUFFICIENCY: ICD-10-CM

## 2024-06-24 PROCEDURE — 99214 OFFICE O/P EST MOD 30 MIN: CPT | Performed by: NURSE PRACTITIONER

## 2024-06-24 RX ORDER — PHENTERMINE AND TOPIRAMATE 15; 92 MG/1; MG/1
15-92 CAPSULE, EXTENDED RELEASE ORAL DAILY
Qty: 30 CAPSULE | Refills: 0 | Status: SHIPPED | OUTPATIENT
Start: 2024-06-24 | End: 2024-07-24

## 2024-06-24 NOTE — ASSESSMENT & PLAN NOTE
Patient's (Body mass index is 30.42 kg/m².) indicates that they are obese (BMI >30) with health conditions that include dyslipidemias . Weight is newly identified. BMI  is above average; BMI management plan is completed. We discussed low calorie, low carb based diet program, portion control, increasing exercise, pharmacologic options including qsymia, and an mildred-based approach such as [employer] Pal or Lose It.     --This is a follow up visit and she is down around one pound  --She has restarted exercise. Personal goal of at min 3 days per week  --Continue Qsymia 15mg. ELYSSA and ANABELA reviewed. Discussed adding possible compounded trizepatide  --Working on getting at min 2-3 meals per day. Keep up great   --Will provide BP and HR reading via message via phone.

## 2024-07-23 NOTE — PROGRESS NOTES
[facility]  [address]  [place] [address]     Office Note      Date: 2024  Patient Name: Marina Diaz  MRN: [id number]  : 1970    Subjective     Chief Complaint  Obesity Management follow-up    Marina Diaz presents to [facility] for obesity management.     Patient is satisfied with weight loss progress. Appetite is moderately controlled. Reports no side effects of prescribed medications today. The patient is taking multivitamin and is not taking fish oil.  The patient is using a food journal.  The patient rates current efforts as 6 out of 10. Reports that she is increasing her protein and trying to eat more regularly throughout the day vs eating majority of calories in the evening.     The patient is exercising with a FITT score of:    Frequency Intensity Time Strength Training   []   0, none []   0 []   0 [x]   0   []   1 (1-2x/week) [x]   1 (light) []   1 (<10 min) []   1 (1x/week)   [x]   2 (3-5x/week) []   2 (moderate) []   2 (10-20 min) []   2 (2x/week)   []   3 (daily) []   3 (moderately hard)  []   4 (very hard) []   3 (20-30 min)  [x]   4 (>30 min) []   3 (3-4x/week)     Review of Systems   Constitutional:  Negative for appetite change and fatigue.   Eyes:  Negative for visual disturbance.   Cardiovascular:  Negative for chest pain and palpitations.   Gastrointestinal:  Negative for constipation and indigestion.   Neurological:  Negative for light-headedness.       Objective   Start weight: 215.2 pounds.    Total Loss lb/%Loss of beginning body weight (BBW): -37.4lb/-17.37%  Change in weight since last visit: -3.8    Recent Weight History:   Wt Readings from Last 6 Encounters:   24 80.6 kg (177 lb 12.8 oz)   24 81.6 kg (180 lb)   24 82.4 kg (181 lb 9.6 oz)   04/15/24 80.9 kg (178 lb 6.4 oz)   24 78.6 kg (173 lb 3.2 oz)   24 79.4 kg (175 lb)     Body mass index  "is 30.05 kg/m².   Body composition analysis completed and showed:   Body Fat %: 41.9    Measurements (in inches)  Waist Circumference: 38.5    Vital Signs:   /78 (BP Location: Left arm, Patient Position: Sitting)   Pulse 83   Ht 163.8 cm (64.5\")   Wt 80.6 kg (177 lb 12.8 oz)   BMI 30.05 kg/m²       Physical Exam  Vitals reviewed.   Constitutional:       Appearance: She is well-developed. She is obese. She is not ill-appearing.   Pulmonary:      Effort: Pulmonary effort is normal.   Neurological:      Mental Status: She is alert.   Psychiatric:         Attention and Perception: Attention and perception normal.         Mood and Affect: Mood normal.         Speech: Speech normal.         Behavior: Behavior normal. Behavior is cooperative.        Result Review :     Common labs          9/25/2023    09:36 5/20/2024    08:59   Common Labs   Glucose 69     BUN 10     Creatinine 0.96     Sodium 137     Potassium 3.8     Chloride 104     Calcium 9.3     Albumin 4.1     Total Bilirubin 0.4     Alkaline Phosphatase 54     AST (SGOT) 26     ALT (SGPT) 15     WBC 5.06     Hemoglobin 13.0     Hematocrit 38.5     Platelets 251     Total Cholesterol 222     Triglycerides 119     HDL Cholesterol 55     LDL Cholesterol  146     Hemoglobin A1C 5.40  5.30          Assessment / Plan        Diagnoses and all orders for this visit:    1. Obesity (BMI 30-39.9) (Primary)  Assessment & Plan:  Patient's (Body mass index is 30.05 kg/m².) indicates that they are obese (BMI >30) with health conditions that include dyslipidemias . Weight is improving with treatment. BMI  is above average; BMI management plan is completed. We discussed low calorie, low carb based diet program, portion control, increasing exercise, pharmacologic options including qsymia, and an mildred-based approach such as [employer] Pal or Lose It.     Orders:  -     Phentermine-Topiramate (Qsymia) 15-92 MG capsule sustained-release 24 hr; Take 15-92 mg by mouth Daily for " 30 days.  Dispense: 30 capsule; Refill: 0    We discussed the risks, benefits, and limitations of treatments. Continue medications and OTC supplements as discussed. Patient verbalizes understanding of and agreement with management plan.     Follow Up     Return in about 4 weeks (around 8/22/2024).  Patient was given instructions and counseling regarding her condition or for health maintenance advice. Please see specific information pulled into the AVS if appropriate.     I spent 30 minutes on this date of service. This time includes time spent by me in the following activities:preparing for the visit, counseling and educating the patient/family/caregiver, ordering medications, tests, or procedures and documenting information in the medical record.    Shelbi Pereira, LEOPOLDO  07/25/2024

## 2024-07-25 ENCOUNTER — OFFICE VISIT (OUTPATIENT)
Dept: BARIATRICS/WEIGHT MGMT | Facility: CLINIC | Age: 54
End: 2024-07-25
Payer: COMMERCIAL

## 2024-07-25 VITALS
SYSTOLIC BLOOD PRESSURE: 114 MMHG | WEIGHT: 177.8 LBS | DIASTOLIC BLOOD PRESSURE: 78 MMHG | HEART RATE: 83 BPM | HEIGHT: 65 IN | BODY MASS INDEX: 29.62 KG/M2

## 2024-07-25 DIAGNOSIS — E66.9 OBESITY (BMI 30-39.9): Primary | ICD-10-CM

## 2024-07-25 PROCEDURE — 99214 OFFICE O/P EST MOD 30 MIN: CPT | Performed by: NURSE PRACTITIONER

## 2024-07-25 RX ORDER — PHENTERMINE AND TOPIRAMATE 15; 92 MG/1; MG/1
15-92 CAPSULE, EXTENDED RELEASE ORAL DAILY
Qty: 30 CAPSULE | Refills: 0 | Status: SHIPPED | OUTPATIENT
Start: 2024-07-25 | End: 2024-08-24

## 2024-07-25 NOTE — ASSESSMENT & PLAN NOTE
Patient's (Body mass index is 30.05 kg/m².) indicates that they are obese (BMI >30) with health conditions that include dyslipidemias . Weight is improving with treatment. BMI  is above average; BMI management plan is completed. We discussed low calorie, low carb based diet program, portion control, increasing exercise, pharmacologic options including qsymia, and an mildred-based approach such as SuppreMol Pal or Lose It.

## 2024-08-27 ENCOUNTER — OFFICE VISIT (OUTPATIENT)
Dept: BARIATRICS/WEIGHT MGMT | Facility: CLINIC | Age: 54
End: 2024-08-27
Payer: COMMERCIAL

## 2024-08-27 VITALS
BODY MASS INDEX: 30.01 KG/M2 | DIASTOLIC BLOOD PRESSURE: 68 MMHG | SYSTOLIC BLOOD PRESSURE: 122 MMHG | HEIGHT: 64 IN | HEART RATE: 83 BPM | WEIGHT: 175.8 LBS

## 2024-08-27 DIAGNOSIS — E66.9 OBESITY (BMI 30-39.9): Primary | ICD-10-CM

## 2024-08-27 PROCEDURE — 99213 OFFICE O/P EST LOW 20 MIN: CPT | Performed by: NURSE PRACTITIONER

## 2024-08-27 RX ORDER — PHENTERMINE AND TOPIRAMATE 7.5; 46 MG/1; MG/1
1 CAPSULE, EXTENDED RELEASE ORAL DAILY
Qty: 30 CAPSULE | Refills: 0 | Status: SHIPPED | OUTPATIENT
Start: 2024-08-27 | End: 2024-09-26

## 2024-08-27 NOTE — ASSESSMENT & PLAN NOTE
Patient's (Body mass index is 30.65 kg/m².) indicates that they are obese (BMI >30) with health conditions that include dyslipidemias . Weight is improving with treatment. BMI  is above average; BMI management plan is completed. We discussed low calorie, low carb based diet program, portion control, increasing exercise, pharmacologic options including qsymia, and an mildred-based approach such as UpDown Pal or Lose It.   -- The follow-up visit patient is down around 2 pounds since last being seen  --Currently taking Qsymia 15 mg.  Patient desires to start weaning this as this will be her last visit.  Advised to take lower dose as prescribed for the next month and safe to discontinue.  --Overall patient is down just shy of 40 pounds 39.14 at around 18% of her original start weight of 215.  --An action plan that if she increases by 2.5% to start self-monitoring and increases by 5% of current weight to take action and seek medical help.

## 2024-08-27 NOTE — PROGRESS NOTES
[facility]  [address]  [place] [address]     Office Note      Date: 2024  Patient Name: Marina Diaz  MRN: [id number]  : 1970    Subjective     Chief Complaint  Obesity Management follow-up    Marina Diaz presents to [facility] for obesity management.     Patient is satisfied with weight loss progress. Appetite is moderately controlled. Reports no side effects of prescribed medications today. The patient is taking multivitamin and is not taking fish oil.  The patient is not using a food journal.  The patient rates current efforts as 5 out of 10. Reports she has desire to discontinue Qsymia as this this be her last visit related to billing issue. She is satisfied where she is     The patient is exercising with a FITT score of:    Frequency Intensity Time Strength Training   []   0, none []   0 []   0 [x]   0   []   1 (1-2x/week) [x]   1 (light) []   1 (<10 min) []   1 (1x/week)   [x]   2 (3-5x/week) []   2 (moderate) []   2 (10-20 min) []   2 (2x/week)   []   3 (daily) []   3 (moderately hard)  []   4 (very hard) []   3 (20-30 min)  [x]   4 (>30 min) []   3 (3-4x/week)     Review of Systems   Constitutional:  Negative for appetite change and fatigue.   Eyes:  Negative for visual disturbance.   Cardiovascular:  Negative for chest pain and palpitations.   Gastrointestinal:  Negative for constipation and indigestion.   Neurological:  Negative for light-headedness.       Objective   Start weight: 215.2 pounds.    Total Loss lb/%Loss of beginning body weight (BBW): -39.14 lb/-18.31 %  Change in weight since last visit: -2.0 lb    Recent Weight History:   Wt Readings from Last 6 Encounters:   24 79.7 kg (175 lb 12.8 oz)   24 80.6 kg (177 lb 12.8 oz)   24 81.6 kg (180 lb)   24 82.4 kg (181 lb 9.6 oz)   04/15/24 80.9 kg (178 lb 6.4 oz)   24 78.6 kg (173 lb 3.2 oz)     Body mass  "index is 30.65 kg/m².   Body composition analysis completed and showed:   Body Fat %: 41.9    Measurements (in inches)  Waist Circumference: 38.5    Vital Signs:   /68 (BP Location: Left arm, Patient Position: Sitting)   Pulse 83   Ht 161.3 cm (63.5\")   Wt 79.7 kg (175 lb 12.8 oz)   BMI 30.65 kg/m²       Physical Exam  Vitals reviewed.   Constitutional:       Appearance: She is obese. She is not ill-appearing.   Pulmonary:      Effort: Pulmonary effort is normal.   Neurological:      Mental Status: She is alert.   Psychiatric:         Attention and Perception: Attention and perception normal.         Behavior: Behavior is cooperative.      Result Review :     Common labs          9/25/2023    09:36 5/20/2024    08:59   Common Labs   Glucose 69     BUN 10     Creatinine 0.96     Sodium 137     Potassium 3.8     Chloride 104     Calcium 9.3     Albumin 4.1     Total Bilirubin 0.4     Alkaline Phosphatase 54     AST (SGOT) 26     ALT (SGPT) 15     WBC 5.06     Hemoglobin 13.0     Hematocrit 38.5     Platelets 251     Total Cholesterol 222     Triglycerides 119     HDL Cholesterol 55     LDL Cholesterol  146     Hemoglobin A1C 5.40  5.30               Assessment / Plan        Diagnoses and all orders for this visit:    1. Obesity (BMI 30-39.9) (Primary)  Assessment & Plan:  Patient's (Body mass index is 30.65 kg/m².) indicates that they are obese (BMI >30) with health conditions that include dyslipidemias . Weight is improving with treatment. BMI  is above average; BMI management plan is completed. We discussed low calorie, low carb based diet program, portion control, increasing exercise, pharmacologic options including qsymia, and an mildred-based approach such as [employer] Pal or Lose It.   -- The follow-up visit patient is down around 2 pounds since last being seen  --Currently taking Qsymia 15 mg.  Patient desires to start weaning this as this will be her last visit.  Advised to take lower dose as prescribed " for the next month and safe to discontinue.  --Overall patient is down just shy of 40 pounds 39.14 at around 18% of her original start weight of 215.  --An action plan that if she increases by 2.5% to start self-monitoring and increases by 5% of current weight to take action and seek medical help.    Orders:  -     Qsymia 7.5-46 MG capsule sustained-release 24 hr; Take 1 capsule by mouth Daily for 30 days.  Dispense: 30 capsule; Refill: 0        We discussed the risks, benefits, and limitations of treatments. Continue medications and OTC supplements as discussed. Patient verbalizes understanding of and agreement with management plan.     Follow Up   Return in about 4 weeks (around 9/24/2024).  Patient was given instructions and counseling regarding her condition or for health maintenance advice. Please see specific information pulled into the AVS if appropriate.     I spent 30 minutes on this date of service. This time includes time spent by me in the following activities:preparing for the visit, counseling and educating the patient/family/caregiver, ordering medications, tests, or procedures and documenting information in the medical record.    Shelbi Pereira, APRN  08/27/2024

## 2024-09-20 ENCOUNTER — TELEPHONE (OUTPATIENT)
Dept: INTERNAL MEDICINE | Facility: CLINIC | Age: 54
End: 2024-09-20

## 2024-09-20 NOTE — TELEPHONE ENCOUNTER
Caller: Marina Diaz    Relationship: Self    Best call back number: [phone]     What orders are you requesting (i.e. lab or imaging): BLOOD WORK FOR ANNUAL PHYSICAL    In what timeframe would the patient need to come in: ASAP BEFORE 10/28    Where will you receive your lab/imaging services: IN OFFICE     Additional notes: PATIENT WOULD LIKE TO HAVE HER BLOOD WORK DONE BEFORE HER APT.

## 2024-09-23 DIAGNOSIS — R73.09 ELEVATED HEMOGLOBIN A1C: Primary | ICD-10-CM

## 2024-09-23 DIAGNOSIS — E78.00 PURE HYPERCHOLESTEROLEMIA: ICD-10-CM

## 2024-10-21 ENCOUNTER — LAB (OUTPATIENT)
Dept: INTERNAL MEDICINE | Facility: CLINIC | Age: 54
End: 2024-10-21
Payer: COMMERCIAL

## 2024-10-21 DIAGNOSIS — Z80.3 FAMILY HISTORY OF BREAST CANCER IN FIRST DEGREE RELATIVE: Primary | ICD-10-CM

## 2024-10-21 DIAGNOSIS — R73.09 ELEVATED HEMOGLOBIN A1C: ICD-10-CM

## 2024-10-21 DIAGNOSIS — R79.89 LOW VITAMIN D LEVEL: ICD-10-CM

## 2024-10-21 LAB
DEPRECATED RDW RBC AUTO: 37.7 FL (ref 37–54)
ERYTHROCYTE [DISTWIDTH] IN BLOOD BY AUTOMATED COUNT: 12.6 % (ref 12.3–15.4)
HBA1C MFR BLD: 4.9 % (ref 4.8–5.6)
HCT VFR BLD AUTO: 35.7 % (ref 34–46.6)
HGB BLD-MCNC: 12.1 G/DL (ref 12–15.9)
MCH RBC QN AUTO: 28.5 PG (ref 26.6–33)
MCHC RBC AUTO-ENTMCNC: 33.9 G/DL (ref 31.5–35.7)
MCV RBC AUTO: 84.2 FL (ref 79–97)
PLATELET # BLD AUTO: 280 10*3/MM3 (ref 140–450)
PMV BLD AUTO: 11.3 FL (ref 6–12)
RBC # BLD AUTO: 4.24 10*6/MM3 (ref 3.77–5.28)
WBC NRBC COR # BLD AUTO: 7.08 10*3/MM3 (ref 3.4–10.8)

## 2024-10-21 PROCEDURE — 36415 COLL VENOUS BLD VENIPUNCTURE: CPT | Performed by: INTERNAL MEDICINE

## 2024-10-21 PROCEDURE — 85027 COMPLETE CBC AUTOMATED: CPT | Performed by: INTERNAL MEDICINE

## 2024-10-21 PROCEDURE — 80053 COMPREHEN METABOLIC PANEL: CPT | Performed by: INTERNAL MEDICINE

## 2024-10-21 PROCEDURE — 80061 LIPID PANEL: CPT | Performed by: INTERNAL MEDICINE

## 2024-10-21 PROCEDURE — 83036 HEMOGLOBIN GLYCOSYLATED A1C: CPT | Performed by: INTERNAL MEDICINE

## 2024-10-22 LAB
ALBUMIN SERPL-MCNC: 4 G/DL (ref 3.5–5.2)
ALBUMIN/GLOB SERPL: 1.2 G/DL
ALP SERPL-CCNC: 51 U/L (ref 39–117)
ALT SERPL W P-5'-P-CCNC: 15 U/L (ref 1–33)
ANION GAP SERPL CALCULATED.3IONS-SCNC: 15.2 MMOL/L (ref 5–15)
AST SERPL-CCNC: 20 U/L (ref 1–32)
BILIRUB SERPL-MCNC: 0.2 MG/DL (ref 0–1.2)
BUN SERPL-MCNC: 18 MG/DL (ref 6–20)
BUN/CREAT SERPL: 17.3 (ref 7–25)
CALCIUM SPEC-SCNC: 9.3 MG/DL (ref 8.6–10.5)
CHLORIDE SERPL-SCNC: 101 MMOL/L (ref 98–107)
CHOLEST SERPL-MCNC: 216 MG/DL (ref 0–200)
CO2 SERPL-SCNC: 20.8 MMOL/L (ref 22–29)
CREAT SERPL-MCNC: 1.04 MG/DL (ref 0.57–1)
EGFRCR SERPLBLD CKD-EPI 2021: 64.4 ML/MIN/1.73
GLOBULIN UR ELPH-MCNC: 3.4 GM/DL
GLUCOSE SERPL-MCNC: 76 MG/DL (ref 65–99)
HDLC SERPL-MCNC: 67 MG/DL (ref 40–60)
LDLC SERPL CALC-MCNC: 137 MG/DL (ref 0–100)
LDLC/HDLC SERPL: 2.02 {RATIO}
POTASSIUM SERPL-SCNC: 4.1 MMOL/L (ref 3.5–5.2)
PROT SERPL-MCNC: 7.4 G/DL (ref 6–8.5)
SODIUM SERPL-SCNC: 137 MMOL/L (ref 136–145)
TRIGL SERPL-MCNC: 67 MG/DL (ref 0–150)
VLDLC SERPL-MCNC: 12 MG/DL (ref 5–40)

## 2024-11-18 ENCOUNTER — OFFICE VISIT (OUTPATIENT)
Dept: INTERNAL MEDICINE | Facility: CLINIC | Age: 54
End: 2024-11-18
Payer: COMMERCIAL

## 2024-11-18 VITALS
SYSTOLIC BLOOD PRESSURE: 140 MMHG | OXYGEN SATURATION: 98 % | HEART RATE: 72 BPM | DIASTOLIC BLOOD PRESSURE: 92 MMHG | BODY MASS INDEX: 31.07 KG/M2 | HEIGHT: 64 IN | WEIGHT: 182 LBS

## 2024-11-18 DIAGNOSIS — N30.00 ACUTE CYSTITIS WITHOUT HEMATURIA: ICD-10-CM

## 2024-11-18 DIAGNOSIS — Z00.00 HEALTH CARE MAINTENANCE: Primary | ICD-10-CM

## 2024-11-18 LAB
BILIRUB BLD-MCNC: NEGATIVE MG/DL
CLARITY, POC: CLEAR
COLOR UR: YELLOW
EXPIRATION DATE: ABNORMAL
GLUCOSE UR STRIP-MCNC: NEGATIVE MG/DL
KETONES UR QL: NEGATIVE
LEUKOCYTE EST, POC: ABNORMAL
Lab: ABNORMAL
NITRITE UR-MCNC: NEGATIVE MG/ML
PH UR: 6.5 [PH] (ref 5–8)
PROT UR STRIP-MCNC: NEGATIVE MG/DL
RBC # UR STRIP: NEGATIVE /UL
SP GR UR: 1.01 (ref 1–1.03)
UROBILINOGEN UR QL: NORMAL

## 2024-11-18 PROCEDURE — 87186 SC STD MICRODIL/AGAR DIL: CPT | Performed by: INTERNAL MEDICINE

## 2024-11-18 PROCEDURE — 99396 PREV VISIT EST AGE 40-64: CPT | Performed by: INTERNAL MEDICINE

## 2024-11-18 PROCEDURE — 81003 URINALYSIS AUTO W/O SCOPE: CPT | Performed by: INTERNAL MEDICINE

## 2024-11-18 PROCEDURE — 87086 URINE CULTURE/COLONY COUNT: CPT | Performed by: INTERNAL MEDICINE

## 2024-11-18 PROCEDURE — 87088 URINE BACTERIA CULTURE: CPT | Performed by: INTERNAL MEDICINE

## 2024-11-18 RX ORDER — FLUCONAZOLE 150 MG/1
150 TABLET ORAL ONCE
Qty: 1 TABLET | Refills: 0 | Status: SHIPPED | OUTPATIENT
Start: 2024-11-18 | End: 2024-11-18

## 2024-11-18 RX ORDER — NITROFURANTOIN 25; 75 MG/1; MG/1
100 CAPSULE ORAL 2 TIMES DAILY
Qty: 10 CAPSULE | Refills: 0 | Status: SHIPPED | OUTPATIENT
Start: 2024-11-18

## 2024-11-18 NOTE — PROGRESS NOTES
Subjective   Marina Diaz is a 54 y.o. female here for yearly physical.  Has had urinary frequency and urgency over the last week.  Before that had no symptoms.  No dysuria.    Past Medical History:   Diagnosis Date    Abdominal hernia     per pt    Arthritis     Elevated hemoglobin A1c     Fibroids     Heart murmur     Hyperlipemia     Hyperlipemia 02/21/2023    Osteoarthritis     Vitamin D insufficiency     Wears glasses      Family History   Problem Relation Age of Onset    Hypertension Mother     Obesity Mother     Cancer Mother         unspecified    Arthritis Mother     Diabetes Mother     Kidney disease Mother     No Known Problems Father     No Known Problems Sister     No Known Problems Brother     No Known Problems Daughter     No Known Problems Son     No Known Problems Maternal Aunt     No Known Problems Maternal Uncle     No Known Problems Paternal Aunt     No Known Problems Paternal Uncle     Cancer Maternal Grandmother         unspecified    No Known Problems Maternal Grandfather     No Known Problems Paternal Grandmother     No Known Problems Paternal Grandfather     Cancer Other     Diabetes Other      Past Surgical History:   Procedure Laterality Date    BREAST BIOPSY      Left    BREAST LUMPECTOMY Bilateral 2007    COLONOSCOPY  2018    FOOT SURGERY      TOTAL ABDOMINAL HYSTERECTOMY N/A 05/29/2019    Procedure: TOTAL ABDOMINAL HYSTERECTOMY WITH BILATERAL SALPINGECTOMY;  Surgeon: Marjorie Deng MD;  Location: [facility];  Service: Obstetrics/Gynecology     Social History     Socioeconomic History    Marital status: Single   Tobacco Use    Smoking status: Never     Passive exposure: Never    Smokeless tobacco: Never   Vaping Use    Vaping status: Never Used   Substance and Sexual Activity    Alcohol use: Yes     Comment: Rare    Drug use: No    Sexual activity: Yes     Partners: Male     Birth control/protection: Condom, Hysterectomy         Current Outpatient Medications:      "Cholecalciferol (VITAMIN D3) 2000 UNITS tablet, Take 1 tablet by mouth Daily., Disp: , Rfl:     multivitamin with minerals (Multivitamin Adults) tablet tablet, Take 1 tablet by mouth Daily., Disp: 30 tablet, Rfl: 2    Probiotic Product (PROBIOTIC DAILY PO), Probiotic, Disp: , Rfl:     Objective   /92 (BP Location: Left arm)   Ht 161.3 cm (63.5\")   Wt 82.6 kg (182 lb)   BMI 31.73 kg/m²   Physical Exam  Vitals reviewed.   Constitutional:       General: She is not in acute distress.     Appearance: Normal appearance. She is well-developed.   HENT:      Head: Normocephalic and atraumatic.      Right Ear: Tympanic membrane, ear canal and external ear normal.      Left Ear: Tympanic membrane, ear canal and external ear normal.      Nose: Nose normal.      Mouth/Throat:      Lips: Pink.      Mouth: Mucous membranes are moist.      Tongue: No lesions.      Pharynx: Oropharynx is clear.   Eyes:      General: Lids are normal. Vision grossly intact. No scleral icterus.     Conjunctiva/sclera: Conjunctivae normal.      Pupils: Pupils are equal, round, and reactive to light.   Neck:      Thyroid: No thyroid mass, thyromegaly or thyroid tenderness.      Vascular: No carotid bruit.   Cardiovascular:      Rate and Rhythm: Normal rate and regular rhythm.      Heart sounds: Normal heart sounds. No murmur heard.     No friction rub. No gallop. No S3 or S4 sounds.   Pulmonary:      Effort: Pulmonary effort is normal.      Breath sounds: Normal breath sounds. No wheezing, rhonchi or rales.   Abdominal:      General: Bowel sounds are normal. There is no distension.      Palpations: Abdomen is soft. There is no mass.      Tenderness: There is no abdominal tenderness.   Musculoskeletal:         General: Normal range of motion.      Cervical back: Neck supple.      Right lower leg: No edema.      Left lower leg: No edema.   Lymphadenopathy:      Cervical: No cervical adenopathy.   Skin:     General: Skin is warm and dry.      " Coloration: Skin is not pale.      Findings: No erythema or rash.   Neurological:      Mental Status: She is alert and oriented to person, place, and time. Mental status is at baseline.      Cranial Nerves: No cranial nerve deficit.      Sensory: No sensory deficit.      Gait: Gait is intact.      Comments: CNs grossly intact. Balance grossly intact.   Psychiatric:         Attention and Perception: Attention normal.         Mood and Affect: Mood normal.         Speech: Speech normal.         Behavior: Behavior normal.         Thought Content: Thought content normal.         Judgment: Judgment normal.         Assessment & Plan   Diagnoses and all orders for this visit:    1. Health care maintenance (Primary)    2. Acute cystitis without hematuria  -     nitrofurantoin, macrocrystal-monohydrate, (Macrobid) 100 MG capsule; Take 1 capsule by mouth 2 (Two) Times a Day.  Dispense: 10 capsule; Refill: 0    Other orders  -     fluconazole (Diflucan) 150 MG tablet; Take 1 tablet by mouth 1 (One) Time for 1 dose.  Dispense: 1 tablet; Refill: 0        1. Health Care Maintenance  -pap UTD  -mamm UTD  -colon cancer screening UTD  -fasting labs  -BMI is >= 30 and <35. (Class 1 Obesity). The following options were offered after discussion;: Information on healthy weight added to patient's after visit summary.    Reviewed the following with the patient: Dermatology and dental screenings  Counseled regarding: age-appropriate screening labs and tests, wearing seatbelt and sunscreen regularly  Discussed: regular exercise and diet changes to promote healthy weight, checking skin regularly for abnormal moles and lesions

## 2024-11-20 LAB — BACTERIA SPEC AEROBE CULT: ABNORMAL

## 2025-05-15 DIAGNOSIS — N30.00 ACUTE CYSTITIS WITHOUT HEMATURIA: ICD-10-CM

## 2025-05-15 RX ORDER — NITROFURANTOIN 25; 75 MG/1; MG/1
100 CAPSULE ORAL 2 TIMES DAILY
Qty: 10 CAPSULE | Refills: 0 | OUTPATIENT
Start: 2025-05-15

## (undated) DEVICE — COVER,LIGHT HANDLE,FLX,1/PK: Brand: MEDLINE INDUSTRIES, INC.

## (undated) DEVICE — DRSNG WND GZ PAD BORDERED 4X8IN STRL

## (undated) DEVICE — SKIN AFFIX SURG ADHESIVE 72/CS 0.55ML: Brand: MEDLINE

## (undated) DEVICE — SUT VIC 0 TIES 18IN J912G

## (undated) DEVICE — TRY SKINPREP DRYPREP

## (undated) DEVICE — GLV SURG SENSICARE MICRO PF LF 7.5 STRL

## (undated) DEVICE — SUT MONOCRYL PLS ANTIB UND 3/0  PS1 27IN

## (undated) DEVICE — 3M™ STERI-STRIP™ REINFORCED ADHESIVE SKIN CLOSURES, R1547, 1/2 IN X 4 IN (12 MM X 100 MM), 6 STRIPS/ENVELOPE: Brand: 3M™ STERI-STRIP™

## (undated) DEVICE — ANTIBACTERIAL UNDYED BRAIDED (POLYGLACTIN 910), SYNTHETIC ABSORBABLE SUTURE: Brand: COATED VICRYL

## (undated) DEVICE — SUT MNCRYL PLS ANTIB UD 4/0 PS2 18IN

## (undated) DEVICE — ADHS LIQ MASTISOL 2/3ML

## (undated) DEVICE — GLV SURG SENSICARE MICRO PF LF 6.5 STRL

## (undated) DEVICE — GLV SURG SIGNATURE TOUCH PF LTX 7 STRL

## (undated) DEVICE — GLV SURG SENSICARE MICRO PF LF 7 STRL

## (undated) DEVICE — SUT VICYL PLS CTD ANTIB BR 1 27IN VIL

## (undated) DEVICE — LEX BASIC NO DRAPE: Brand: MEDLINE INDUSTRIES, INC.

## (undated) DEVICE — SYR TB SFTY 1CC W 27G 1/2IN NDL

## (undated) DEVICE — SUT PDS LP 1 TP1 96IN VIO PDP880GA

## (undated) DEVICE — TP APPL FIBRIJET TOPICAL CONICL SGL/SPRY STRL

## (undated) DEVICE — APPL CHLORAPREP W/ALC 26ML CLR